# Patient Record
Sex: MALE | Employment: OTHER | URBAN - METROPOLITAN AREA
[De-identification: names, ages, dates, MRNs, and addresses within clinical notes are randomized per-mention and may not be internally consistent; named-entity substitution may affect disease eponyms.]

---

## 2018-01-10 ENCOUNTER — MEDICAL CORRESPONDENCE (OUTPATIENT)
Dept: HEALTH INFORMATION MANAGEMENT | Facility: CLINIC | Age: 83
End: 2018-01-10

## 2018-01-11 ENCOUNTER — HOSPITAL ENCOUNTER (INPATIENT)
Facility: CLINIC | Age: 83
LOS: 4 days | Discharge: NURSING, REHAB OR LONG-TERM CARE | DRG: 698 | End: 2018-01-15
Attending: INTERNAL MEDICINE | Admitting: INTERNAL MEDICINE
Payer: MEDICARE

## 2018-01-11 ENCOUNTER — TRANSFERRED RECORDS (OUTPATIENT)
Dept: HEALTH INFORMATION MANAGEMENT | Facility: CLINIC | Age: 83
End: 2018-01-11

## 2018-01-11 DIAGNOSIS — N13.2 HYDRONEPHROSIS WITH URINARY OBSTRUCTION DUE TO URETERAL CALCULUS: ICD-10-CM

## 2018-01-11 DIAGNOSIS — N39.0 URINARY TRACT INFECTION WITH HEMATURIA, SITE UNSPECIFIED: ICD-10-CM

## 2018-01-11 DIAGNOSIS — K59.09 OTHER CONSTIPATION: Primary | ICD-10-CM

## 2018-01-11 DIAGNOSIS — R31.9 URINARY TRACT INFECTION WITH HEMATURIA, SITE UNSPECIFIED: ICD-10-CM

## 2018-01-11 PROBLEM — F03.90 DEMENTIA (H): Status: ACTIVE | Noted: 2018-01-11

## 2018-01-11 PROBLEM — R45.1 AGITATION: Status: ACTIVE | Noted: 2018-01-11

## 2018-01-11 PROBLEM — G93.40 ENCEPHALOPATHY: Status: ACTIVE | Noted: 2018-01-11

## 2018-01-11 PROCEDURE — 99207 ZZC APP CREDIT; MD BILLING SHARED VISIT: CPT | Performed by: INTERNAL MEDICINE

## 2018-01-11 PROCEDURE — 99219 ZZC INITIAL OBSERVATION CARE,LEVL II: CPT | Performed by: INTERNAL MEDICINE

## 2018-01-11 PROCEDURE — 12000000 ZZH R&B MED SURG/OB

## 2018-01-11 PROCEDURE — G0378 HOSPITAL OBSERVATION PER HR: HCPCS

## 2018-01-11 PROCEDURE — 25000132 ZZH RX MED GY IP 250 OP 250 PS 637: Performed by: INTERNAL MEDICINE

## 2018-01-11 PROCEDURE — 99207 ZZC CDG-CODE CATEGORY CHANGED: CPT | Performed by: INTERNAL MEDICINE

## 2018-01-11 PROCEDURE — 25000128 H RX IP 250 OP 636: Performed by: INTERNAL MEDICINE

## 2018-01-11 RX ORDER — TAMSULOSIN HYDROCHLORIDE 0.4 MG/1
0.4 CAPSULE ORAL DAILY
Status: DISCONTINUED | OUTPATIENT
Start: 2018-01-11 | End: 2018-01-15 | Stop reason: HOSPADM

## 2018-01-11 RX ORDER — AMOXICILLIN 250 MG
2 CAPSULE ORAL 2 TIMES DAILY PRN
Status: DISCONTINUED | OUTPATIENT
Start: 2018-01-11 | End: 2018-01-15 | Stop reason: HOSPADM

## 2018-01-11 RX ORDER — SODIUM CHLORIDE 9 MG/ML
INJECTION, SOLUTION INTRAVENOUS CONTINUOUS
Status: DISCONTINUED | OUTPATIENT
Start: 2018-01-11 | End: 2018-01-14

## 2018-01-11 RX ORDER — NALOXONE HYDROCHLORIDE 0.4 MG/ML
.1-.4 INJECTION, SOLUTION INTRAMUSCULAR; INTRAVENOUS; SUBCUTANEOUS
Status: DISCONTINUED | OUTPATIENT
Start: 2018-01-11 | End: 2018-01-15 | Stop reason: HOSPADM

## 2018-01-11 RX ORDER — BISACODYL 10 MG
10 SUPPOSITORY, RECTAL RECTAL DAILY PRN
Status: DISCONTINUED | OUTPATIENT
Start: 2018-01-11 | End: 2018-01-15 | Stop reason: HOSPADM

## 2018-01-11 RX ORDER — CITALOPRAM HYDROBROMIDE 20 MG/1
20 TABLET ORAL DAILY
Status: DISCONTINUED | OUTPATIENT
Start: 2018-01-11 | End: 2018-01-15 | Stop reason: HOSPADM

## 2018-01-11 RX ORDER — AMOXICILLIN 250 MG
1 CAPSULE ORAL 2 TIMES DAILY PRN
Status: DISCONTINUED | OUTPATIENT
Start: 2018-01-11 | End: 2018-01-15 | Stop reason: HOSPADM

## 2018-01-11 RX ADMIN — CITALOPRAM HYDROBROMIDE 20 MG: 20 TABLET ORAL at 08:12

## 2018-01-11 RX ADMIN — SODIUM CHLORIDE: 9 INJECTION, SOLUTION INTRAVENOUS at 05:57

## 2018-01-11 RX ADMIN — SODIUM CHLORIDE: 9 INJECTION, SOLUTION INTRAVENOUS at 15:28

## 2018-01-11 RX ADMIN — TAMSULOSIN HYDROCHLORIDE 0.4 MG: 0.4 CAPSULE ORAL at 08:13

## 2018-01-11 NOTE — IP AVS SNAPSHOT
` [place] [address] [facility]: [phone]                                              INTERAGENCY TRANSFER FORM - NURSING   2018                    Hospital Admission Date: 2018  ARELIS ALLAN   : 1935  Sex: Male        Attending Provider: Prasanth Zavala MD     Allergies:  Oxycodone    Infection:  None   Service:  INTERNAL MED    Ht:  --   Wt:  66.4 kg (146 lb 4.8 oz)   Admission Wt:  66.4 kg (146 lb 4.8 oz)    BMI:  --   BSA:  --            Patient PCP Information     Provider PCP Type    Physician No Ref-Primary General      Current Code Status     Date Active Code Status Order ID Comments User Context       Prior      Code Status History     Date Active Date Inactive Code Status Order ID Comments User Context    1/15/2018  9:31 AM  Special Code [id number] CPR OK. But do not intubate  (per POLST) Valdez Mclaughlin,  Outpatient    2018  5:58 AM 1/15/2018  9:31 AM Special Code [id number] CPR OK. But do not intubate  (per POLST) Yobany Santillan MD Inpatient    2018  5:21 AM 2018  5:58 AM Full Code [id number]  Yobany Santillan MD Inpatient      Advance Directives        Does patient have a scanned Advance Directive/ACP document in EPIC?           Yes        Hospital Problems as of 1/15/2018              Priority Class Noted POA    Encephalopathy Medium  2018 Yes    Dementia Medium  2018 Unknown    Urinary tract infection Medium  2018 Unknown    Urethral stenosis Medium  2018 Unknown    Agitation Medium  2018 Yes    UTI (urinary tract infection) Medium  2018 Yes      Non-Hospital Problems as of 1/15/2018              Priority Class Noted    iamSPRAIN ROTATOR CUFF Medium  7/3/2006      Immunizations     Name Date      Influenza (High Dose) 3 valent vaccine 10/16/17     Pneumo Conj 13-V (2010&after) 12/04/15          END      ASSESSMENT     Discharge Profile Flowsheet     EXPECTED DISCHARGE     SKIN       "Expected Discharge Date  01/13/18 01/11/18 1200   Inspection of bony prominences  Full 01/14/18 2211    DISCHARGE NEEDS ASSESSMENT     Inspection under devices  Full 01/14/18 2211    Equipment Currently Used at Home  walker, rolling 01/13/18 1236   Skin WDL  ex 01/15/18 1046    Transportation Available  family or friend will provide 01/13/18 1236   Skin Temperature  warm 01/15/18 1046    GASTROINTESTINAL (ADULT,PEDIATRIC,OB)     Skin Moisture  flaky;dry 01/15/18 1046    GI WDL  WDL 01/15/18 1046   Skin Integrity  bruise(s);scab(s) 01/15/18 1046    Passing flatus  yes 01/14/18 2211   SAFETY      COMMUNICATION ASSESSMENT     Safety WDL  WDL 01/15/18 1046    Patient's communication style  spoken language (English or Bilingual) 01/11/18 0526   All Alarms  (P)  alarm(s) activated and audible 01/15/18 1615                 Assessment WDL (Within Defined Limits) Definitions           Safety WDL     Effective: 09/28/15    Row Information: <b>WDL Definition:</b> Bed in low position, wheels locked; call light in reach; upper side rails up x 2; ID band on<br> <font color=\"gray\"><i>Item=AS safety wdl>>List=AS safety wdl>>Version=F14</i></font>      Skin WDL     Effective: 09/28/15    Row Information: <b>WDL Definition:</b> Warm; dry; intact; elastic; without discoloration; pressure points without redness<br> <font color=\"gray\"><i>Item=AS skin wdl>>List=AS skin wdl>>Version=F14</i></font>      Vitals     Vital Signs Flowsheet     VITAL SIGNS     Pain Location  Leg 01/12/18 1519    Temp  97.8  F (36.6  C) 01/15/18 1600   Pain Orientation  Right;Lower 01/12/18 1519    Temp src  Oral 01/15/18 1600   Pain Intervention(s)  MD notified (Comment) 01/12/18 1519    Resp  18 01/15/18 0749   HEIGHT AND WEIGHT      Pulse  70 01/15/18 1600   Weight  66.4 kg (146 lb 4.8 oz) 01/11/18 0604    Pulse/Heart Rate Source  Monitor 01/15/18 1600   Weight Method  Bed scale 01/11/18 0604    BP  129/76 01/15/18 1600   POSITIONING      BP Location  Right " arm 01/15/18 1600   Body Position  side-lying, right;independently positioning 01/15/18 0712    OXYGEN THERAPY     Head of Bed (HOB)  HOB at 20-30 degrees 01/15/18 0712    SpO2  95 % 01/15/18 1600   Positioning/Transfer Devices  pillows;in use 01/15/18 0712    O2 Device  None (Room air) 01/15/18 1600   Chair  Upright in chair 01/15/18 0752    PAIN/COMFORT     DAILY CARE      Patient Currently in Pain  denies 01/15/18 1413   Activity Management  activity encouraged;up in chair 01/15/18 1414    Preferred Pain Scale  number (Numeric Rating Pain Scale) 01/14/18 0930   Activity Assistance Provided  assistance, 1 person 01/15/18 1414    Patient's Stated Pain Goal  No pain 01/14/18 0930   Assistive Device Utilized  walker;gait belt 01/15/18 1414    0-10 Pain Scale  0 01/15/18 0826                 Patient Lines/Drains/Airways Status    Active LINES/DRAINS/AIRWAYS     Name: Placement date: Placement time: Site: Days: Last dressing change:    Urethral Catheter 01/11/18   1500      4     Peripheral IV 01/12/18 Left Lower forearm 01/12/18      Lower forearm   3     Wound 01/13/18 Left;Lateral;Lower Leg Abrasion(s) 01/13/18      Leg   2             Patient Lines/Drains/Airways Status    Active PICC/CVC     None            Intake/Output Detail Report     Date Intake   Output Net    Shift P.O. I.V. Total Urine Total       Day 01/14/18 0700 - 01/14/18 1459 --  1000 -400    Violeta 01/14/18 1500 - 01/14/18 2259 -- -- -- 1100 1100 -1100    Noc 01/14/18 2300 - 01/15/18 0659 -- -- -- -- -- 0    Day 01/15/18 0700 - 01/15/18 1459 480 -- 480 1725 1725 -1245    Violeta 01/15/18 1500 - 01/15/18 2259 -- -- -- -- -- 0      Last Void/BM       Most Recent Value    Urine Occurrence 1 at 01/14/2018 1943    Stool Occurrence 1 at 01/14/2018 1943      Case Management/Discharge Planning     Case Management/Discharge Planning Flowsheet     LIVING ENVIRONMENT     Final Note  -- (D/C to [facility]) 01/15/18 1203    Lives With  alone 01/13/18 1236   FINAL  RESOURCES      Living Arrangements  assisted living 01/13/18 1236   Equipment Currently Used at Home  walker, rolling 01/13/18 1236    COPING/STRESS     ABUSE RISK SCREEN      Major Change/Loss/Stressor  hospitalization 01/11/18 0526   QUESTION TO PATIENT:  Has a member of your family or a partner(now or in the past) intimidated, hurt, manipulated, or controlled you in any way?  no 01/11/18 0630    EXPECTED DISCHARGE     QUESTION TO PATIENT: Do you feel safe going back to the place where you are living?  yes 01/11/18 0630    Expected Discharge Date  01/13/18 01/11/18 1200   OBSERVATION: Is there reason to believe there has been maltreatment of a vulnerable adult (ie. Physical/Sexual/Emotional abuse, self neglect, lack of adequate food, shelter, medical care, or financial exploitation)?  no 01/11/18 0630    DISCHARGE PLANNING     (R) MENTAL HEALTH SUICIDE RISK      Transportation Available  family or friend will provide 01/13/18 1236   Are you depressed or being treated for depression?  No 01/11/18 0630    FINAL NOTE

## 2018-01-11 NOTE — IP AVS SNAPSHOT
` `           [place] [address] [facility] UNIT: [phone]                 INTERAGENCY TRANSFER FORM - NOTES (H&P, Discharge Summary, Consults, Procedures, Therapies)   2018                    Hospital Admission Date: 2018  ARELIS PERKINS   : 1935  Sex: Male        Patient PCP Information     Provider PCP Type    Physician No Ref-Primary General         History & Physicals      H&P signed by Yobany Santillan MD at 2018  6:55 AM      Author:  Yobany Santillan MD Service:  Hospitalist Author Type:  Physician    Filed:  2018  6:55 AM Date of Service:  2018  6:13 AM Creation Time:  2018  6:46 AM    Status:  Signed :  Yobany Santillan MD (Physician)         DATE OF SERVICE:  2018      CHIEF COMPLAINT:  Confusion and dizziness.      HISTORY OF PRESENT ILLNESS:  Mr. Arleis Perkins is a pleasant 82-year-old gentleman with dementia who lives in an assisted living facility who initially presented to the [facility] Emergency Department with his son because of dizziness and confusion that occurred today at his assisted living.  According to the son who is his primary historian the patient had developed some nausea and vomiting during the evening which resulted in a fall.  At that point he decided to take the patient to the Emergency Department.  He was seen in the [facility] Emergency Department and while there underwent routine labs as well as a CT scan of the head due to the fall and confusion.  The head CT was read as negative for any acute intracranial abnormalities, but he does have some age-related brain atrophy and evidence of chronic small vessel ischemic disease.  The patient was noted to have a large amount of leukocyte esterase, trace amount of occult blood that was suspicious for a urinary tract infection.  Additionally, there is some associated leukocytosis in the patient's peripheral blood and based on these findings, the ER  physician there started the patient on ceftriaxone.  Also noteworthy he had a mild elevation of his lactate at 2.1 and had elevated BUN to creatinine ratio of 22-1.06 and as such, he was given IV fluids as well.  There were no beds in the [facility] and as such, the patient was transferred to [facility] here where I met with the patient and his son on the floor.  The patient himself is fairly confused at this time and is more confused than his baseline according to the son.  He is not really able to provide any history for me.  He is pleasant and conversant, but does not recall any of the events of the evening.  Per the son, he has dementia but is normally not this out of sorts.  The son provides the rest of history for me.  There have not been any definite fevers, he has not been short of breath or any chest pain, no diarrhea and he has not had any recent blood in the stools.  They did place a Smith catheter at the [facility] due to the patient's urinary retention and that remains in place at this time with now clear urine output.      The son denies any history of any heart disease, heart failure or stroke, seizure, clotting or bleeding disorders or thyroid disease or diabetes.      PAST MEDICAL HISTORY:   1.  Urinary retention.   2.  History of falls.   3.  History of displaced subtrochanteric fracture of the right femur in 09/2017.   4.  Dementia.      SOCIAL HISTORY:  The patient is a lifelong nonsmoker and does not drink alcohol.      FAMILY HISTORY:  Negative for heart disease or diabetes.      PRIOR TO ADMISSION MEDICATIONS:   1.  Tylenol 500 mg p.o. 2 tabs 3 times daily p.r.n.   2.  Vitamin D3 at 2000 units p.o. daily.   3.  Celexa 20 mg p.o. daily.   4.  Senokot-S 1 tablet p.o. twice daily.   5.  Flomax 0.4 mg p.o. daily.      ALLERGIES:  Hallucinations with Oxycodone.      PAST SURGICAL HISTORY:     1.  Status post ORIF of right hip in 09/2017.   2.  Status post colonoscopies in the  past.      REVIEW OF SYSTEMS:  A 10-point system review was impossible for the patient, but I did inquire via his son as the surrogate historian and it was negative with the exception of those complaints noted in the HPI.      PHYSICAL EXAMINATION:   VITAL SIGNS:  Temperature 98.4, heart rate 65, blood pressure 135/78, respiratory rate 16, O2 saturation 94% on room air.   GENERAL:  This is a very pleasant, but clearly demented gentleman who is conversant but pleasantly confused.  He is lying in a hospital bed.  His son is at his bedside.   HEENT:  No facial asymmetry.  Eyes:  Pupils are equally reactive to light bilaterally, extraocular movements are intact.  Oropharynx is clear.  Tongue is midline.  Mucous membranes are a little bit dry appearing.   RESPIRATORY:  Clear lung fields bilaterally without crackles or wheezes.   CARDIAC:  Regular rate and rhythm, S1, S2 are heard.  There is a 3/6 systolic ejection murmur best heard at the upper left sternal border.   ABDOMEN:  Soft, nontender, nondistended.  Bowel sounds are heard.   EXTREMITIES:  Lower extremity exam is negative for significant pedal edema.  Pedal pulses are about 1.5 plus bilaterally.   SKIN:  The patient has good capillary refill at this time.   NEUROLOGIC:  Cranial nerves II through XII are grossly intact on cursory exam.      LABORATORY FINDINGS INVESTIGATIONS:  A CT scan of the head was performed at the 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  A white blood cell count was 13.7, hemoglobin 14.6 and a platelet count was 259.  The absolute neutrophil count was 12.1.  Lactic acid was 2.1, sodium was 139, potassium 4.4, chloride 101, CO2 of 24, BUN is 22 with creatinine 1.06  and a glucose was elevated at 146, calcium 9.8.  Chest x-ray was also performed at the outside facility and read as bibasilar moderate discoid atelectasis with chronic-appearing mild compression deformity present in the lower thoracic spine.      ASSESSMENT AND PLAN:  This is an 82-year-old gentleman presenting with an acute metabolic encephalopathy secondary to urinary tract infection.      1.  Urinary tract infection.  He was initiated on ceftriaxone at the outside facility.  We will continue IV ceftriaxone 1 gram q.24 h. We will also hydrate him with IV fluids.   2.  Lactic acidosis is likely secondary to dehydration and urinary tract infection.  We will hydrate him with IV fluids.   3.  Metabolic encephalopathy.  This is likely secondary to the urinary tract infection.  We will treat the underlying infection and monitor his clinical status.     4.  Dementia.  Normally his dementia is stable without behavioral disturbances but at present he is having some encephalopathy and dizziness.  We will hydrate him with IV fluids and redirect him.   5.  History of urinary retention.  He has a Smith catheter in place now.  He normally takes Flomax as an outpatient.  We will resume his prior to admission Flomax.   6.  Depression.  He normally uses Celexa.  We will continue this.   7.  Deep venous thrombosis prophylaxis will be pneumatic compression devices.      CODE STATUS:  I discussed with his son and reviewed a copy of the POLST form that the patient's daughter sent to the son via his telephone and according to the POLST the patient is okay with CPR but not with intubation and I have put in an order to reflect this in the chart.      DISPOSITION:  Inpatient.  I anticipate he will be here a few days.         CONSUELO LIPSCOMB MD             D: 2018 06:13   T: 2018 06:45   MT:       Name:     ARELIS ALLAN   MRN:      [id number]        Account:      [id number]   :      1935            Admitted:     [id number]      Document: [id number]    [PG1.1]   Revision History        User Key Date/Time User Provider Type Action    > PG1.1 1/11/2018  6:55 AM Yobany Santillan MD Physician Sign                     Discharge Summaries      Discharge Summaries by Valdez Mclaughlin DO at 1/15/2018  9:31 AM     Author:  Valdez Mclaughlin DO Service:  Hospitalist Author Type:  Physician    Filed:  1/15/2018  9:36 AM Date of Service:  1/15/2018  9:31 AM Creation Time:  1/15/2018  9:31 AM    Status:  Signed :  Valdez Mclaughlin DO (Physician)         [facility]    Discharge Summary  Hospitalist    Date of Admission:  1/11/2018  Date of Discharge:[ES1.1]  1/15/2018[ES1.2]  Discharging Provider: Valdez Mclaughlin DO    Discharge Diagnoses   UTI with   Metabolic encephalopathy  Urinary obstruction, acute    History of Present Illness   Kaz Perkins is an 82 year old male who presented with acute urinary retention    Hospital Course   Kaz Perkins was admitted on 1/11/2018.  The following problems were addressed during his hospitalization:    Active Problems:    Encephalopathy    Dementia    Urinary tract infection    Urethral stenosis    Agitation    UTI (urinary tract infection)  1. UTI, but culture only with < 10,000 CFU of mixed burak. Given unclear presentation, timing of antibiotics at outside hospital will complete 5 day course, although I suspect most of his symptoms are related to obstruction rather than infection  - Continue Rocephin, started 1/11, had 4 days, will complete 1 day of augmentin       2. Urinary retention  - Smith placed at outside ED  - Continue Flomax  - fu with his urologist Dr. chance     3.  Metabolic encephalopathy  - Resolved with treatment of UTI  - Underlying dementia  - Head CT without acute changes  - Redirection as needed  - Fall precautions     4. Depression  - Continue Celexa     5. R leg/hip pain  - X-ray demonstrates  healing ORIF R femur w/o obvious new fracture  - Ortho Consult appreciated, no evidence for fracture  - Ambulate with assist qid  - PT at [facility]    Valdez Mclaughlin, DO    Significant Results and Procedures   NA    Pending Results   These results will be followed up by NA  Unresulted Labs Ordered in the Past 30 Days of this Admission     No orders found from 11/12/2017 to 1/12/2018.          Code Status   CPR but no intubation per polst       Primary Care Physician   Physician No Ref-Primary    Physical Exam   Temp: 97.7  F (36.5  C) Temp src: Oral BP: 131/77 Pulse: 67   Resp: 18 SpO2: 93 % O2 Device: None (Room air)    Vitals:    01/11/18 0527   Weight: 66.4 kg (146 lb 4.8 oz)     Vital Signs with Ranges  Temp:  [97.7  F (36.5  C)-98.1  F (36.7  C)] 97.7  F (36.5  C)  Pulse:  [62-67] 67  Resp:  [16-18] 18  BP: (131-145)/(77-84) 131/77  SpO2:  [92 %-94 %] 93 %  I/O last 3 completed shifts:  In: 600 [I.V.:600]  Out: 2100 [Urine:2100]    Constitutional: Awake, alert, cooperative, no apparent distress.  Eyes: Conjunctiva and pupils examined and normal.  Respiratory: Clear to auscultation bilaterally, no crackles or wheezing.  Cardiovascular: Regular rate and rhythm, normal S1 and S2, and no murmur noted.  Skin: No rashes, no cyanosis, no edema.  Musculoskeletal: No joint swelling, erythema or tenderness.  Neurologic: Cranial nerves 2-12 intact, normal strength and sensation.  Psychiatric: Alert, oriented to person only no obvious anxiety or depression.    Discharge Disposition   Discharged to short-term care facility  Condition at discharge: Stable    Consultations This Hospital Stay   PHYSICAL THERAPY ADULT IP CONSULT  ORTHOPEDIC SURGERY IP CONSULT  PHYSICAL THERAPY ADULT IP CONSULT  OCCUPATIONAL THERAPY ADULT IP CONSULT    Time Spent on this Encounter   IValdez, personally saw the patient today and spent greater than 30 minutes discharging this patient.    Discharge Orders     General info for SNF    Length of Stay Estimate: Short Term Care: Estimated # of Days <30  Condition at Discharge: Improving  Level of care:skilled   Rehabilitation Potential: Fair  Admission H&P remains valid and up-to-date: Yes  Recent Chemotherapy: N/A  Use Nursing Home Standing Orders: N/A     Mantoux instructions   Give two-step Mantoux (PPD) Per Facility Policy Yes     Reason for your hospital stay   Urinary obstruction     Alicia catheter   To straight gravity drainage. Change catheter every 2 weeks and PRN for leaking or decreased uring output with signs of bladder distention. DO NOT change catheter without a specific MD order IF diagnosis of benign prostatic hypertrophy (BPH), neurogenic bladder, or other urological conditions     Follow Up and recommended labs and tests   Follow up with Nursing home physician.  No follow up labs or test are needed.    Follow up with urologist, Dr. Michel in 1-2 weeks for urinary obstruction and to work on alicia removal     Activity - Up with nursing assistance     Special Code (specify)   CPR OK. But do not intubate  (per POLST)     Physical Therapy Adult Consult   Evaluate and treat as clinically indicated.    Reason:  Deconditioning, recent hip fracture     Occupational Therapy Adult Consult   Evaluate and treat as clinically indicated.    Reason:  Deconditioning, recent hip fracture       Discharge Medications   Current Discharge Medication List      START taking these medications    Details   senna-docusate (SENOKOT-S;PERICOLACE) 8.6-50 MG per tablet Take 1 tablet by mouth 2 times daily as needed for constipation  Qty: 100 tablet, Refills: 0    Associated Diagnoses: Other constipation         CONTINUE these medications which have NOT CHANGED    Details   Acetaminophen (TYLENOL PO) Take 1,000 mg by mouth 3 times daily Do not exceed 4000 mg po in 24 hours.      CITALOPRAM HYDROBROMIDE PO Take 20 mg by mouth daily      TAMSULOSIN HCL PO Take 0.4 mg by mouth every morning After breakfast       VITAMIN D, CHOLECALCIFEROL, PO Take 1,000 Units by mouth daily           Allergies   Allergies   Allergen Reactions     Oxycodone Other (See Comments)     HALLUCINATIONS     Data   Most Recent 3 CBC's:[ES1.1]  Recent Labs   Lab Test  01/13/18   0807  01/12/18   0800   WBC  6.3  7.8   HGB  13.4  12.4*   MCV  94  94   PLT  201  221[ES1.2]      Most Recent 3 BMP's:[ES1.1]  Recent Labs   Lab Test  01/13/18   0807   NA  140   POTASSIUM  4.2   CHLORIDE  105   CO2  30   BUN  16   CR  1.06   ANIONGAP  5   EBONIE  8.5   GLC  90[ES1.2]     Most Recent 2 LFT's:[ES1.1]No lab results found.[ES1.2]  Most Recent INR's and Anticoagulation Dosing History:  Anticoagulation Dose History     There is no flowsheet data to display.        Most Recent 3 Troponin's:[ES1.1]No lab results found.[ES1.2]  Most Recent Cholesterol Panel:[ES1.1]No lab results found.[ES1.2]  Most Recent 6 Bacteria Isolates From Any Culture (See EPIC Reports for Culture Details):[ES1.1]No lab results found.[ES1.2]  Most Recent TSH, T4 and A1c Labs:[ES1.1]No lab results found.  Results for orders placed or performed during the hospital encounter of 01/11/18   XR Pelvis w Hip Right 1 View    Narrative    PELVIS WITH UNILATERAL HIP ONE VIEW RIGHT  1/12/2018 4:41 PM     HISTORY: r hip pain;     COMPARISON: None.    FINDINGS: The patient is status post internal fixation of a right  subtrochanteric femur fracture. The intramedullary jeanne is in good  position. There is good alignment of the fracture fragments. Callus is  seen at the fracture site. A small amount of heterotopic bone is seen  cephalad to the greater trochanter. Catheter is present in the  bladder. Mild degenerative change in the hip joints..      Impression    IMPRESSION:   1. Patient is status post internal fixation of a right subtrochanteric  femur fracture. Callus is seen at the fracture site.  2. No acute fractures are seen.  3. Degenerative change in the hip and knee joints.    AMBER SPAIN MD[ES1.2]         Revision History        User Key Date/Time User Provider Type Action    > ES1.2 1/15/2018  9:36 AM Valdze Mclaughlin DO Physician Sign     ES1.1 1/15/2018  9:31 AM Valdez Mclaughlin DO Physician                      Consult Notes      Consults by Edil Grant MD at 1/13/2018 12:34 PM     Author:  Edil Grant MD Service:  Orthopedics Author Type:  Physician    Filed:  1/13/2018 12:39 PM Date of Service:  1/13/2018 12:34 PM Creation Time:  1/13/2018 12:33 PM    Status:  Signed :  Edil Grant MD (Physician)     Consult Orders:    1. Orthopedic Surgery IP Consult: Patient to be seen: Routine - within 24 hours; right hip pain, ORIF in Sept 2017; Consultant may enter orders: Yes [[id number]] ordered by Prasanth Zavala MD at 01/13/18 [address] [place] [address] Orthopedic Consultation    Kaz Perkins MRN# [id number]   Age: 82 year old YOB: 1935     Date of Admission:  1/11/2018    Reason for consult: H/o R hip sx s/p cephalomedullary nail       Requesting physician: Dr. Zavala       Level of consult: One-time consult to assist in determining a diagnosis and to recommend an appropriate treatment plan           Assessment and Plan:   Assessment:   R hip pain      Plan:   - WBAT RLE  - PT/OT for mobilization  - Hip fracture appears to be healing appropriately  - no concern for other R hip pathology  - f/u with operative surgeon at [facility] after discharge           Chief Complaint:   R hip pain         History of Present Illness:   This patient is a 82 year old male who presents with the following condition requiring a hospital admission:    81 yo M with dementia admitted 2/2 confusion and dizziness. Patient has a history of R hip fracture that was treated with cephalomedullary nailing in [facility] in September. Overall patient seems confused and is unable to give a good history. When asked, he did not complain of significant hip pain, except a  bump over her R hip. He states he is able to ambulate. He also states he is not sure why he is here. Overall seems very confused.          Past Medical History:[JM1.1]   No past medical history on file.[JM1.2]          Past Surgical History:[JM1.1]   No past surgical history on file.[JM1.2]          Social History:[JM1.1]     Social History   Substance Use Topics     Smoking status: Not on file     Smokeless tobacco: Not on file     Alcohol use Not on file[JM1.2]             Family History:[JM1.1]   No family history on file.[JM1.2]          Immunizations:     VACCINE/DOSE   Diptheria   DPT   DTAP   HBIG   Hepatitis A   Hepatitis B   HIB   Influenza   Measles   Meningococcal   MMR   Mumps   Pneumococcal   Polio   Rubella   Small Pox   TDAP   Varicella   Zoster             Allergies:[JM1.1]     Allergies   Allergen Reactions     Oxycodone Other (See Comments)     HALLUCINATIONS[JM1.2]             Medications:[JM1.1]     Current Facility-Administered Medications   Medication     acetaminophen (TYLENOL) tablet 1,000 mg     naloxone (NARCAN) injection 0.1-0.4 mg     0.9% sodium chloride infusion     senna-docusate (SENOKOT-S;PERICOLACE) 8.6-50 MG per tablet 1 tablet    Or     senna-docusate (SENOKOT-S;PERICOLACE) 8.6-50 MG per tablet 2 tablet     magnesium hydroxide (MILK OF MAGNESIA) suspension 30 mL     bisacodyl (DULCOLAX) Suppository 10 mg     cefTRIAXone (ROCEPHIN) 1 g in 10 mL SWFI Premix Syringe     tamsulosin (FLOMAX) capsule 0.4 mg     citalopram (celeXA) tablet 20 mg[JM1.2]             Review of Systems:   Negative except as per hpi          Physical Exam:   All vitals have been reviewed[JM1.1]  Patient Vitals for the past 24 hrs:   BP Temp Temp src Pulse Resp SpO2   01/13/18 0011 158/77 97.6  F (36.4  C) Oral 52 16 96 %   01/12/18 1945 148/73 97.7  F (36.5  C) Oral 61 16 94 %   01/12/18 1558 146/79 97.8  F (36.6  C) Oral 58 16 94 %[JM1.2]       Intake/Output Summary (Last 24 hours) at 01/13/18 1234  Last data  filed at 01/13/18 1007   Gross per 24 hour   Intake             1900 ml   Output             1900 ml   Net                0 ml     RLE:  R hip incisions c/d/i  Flexion 90, IR 10, ER 30 without significant pain  +mild bony protuberance over the lateral hip, due to new bone formation evident on xray.  +DF/PF/EHL  sensation intact throughout RLE  Unable to test gait 2/2 patient mental status          Data:   All laboratory data reviewed[JM1.1]  Results for orders placed or performed during the hospital encounter of 01/11/18   XR Pelvis w Hip Right 1 View    Narrative    PELVIS WITH UNILATERAL HIP ONE VIEW RIGHT  1/12/2018 4:41 PM     HISTORY: r hip pain;     COMPARISON: None.    FINDINGS: The patient is status post internal fixation of a right  subtrochanteric femur fracture. The intramedullary jeanne is in good  position. There is good alignment of the fracture fragments. Callus is  seen at the fracture site. A small amount of heterotopic bone is seen  cephalad to the greater trochanter. Catheter is present in the  bladder. Mild degenerative change in the hip joints..      Impression    IMPRESSION:   1. Patient is status post internal fixation of a right subtrochanteric  femur fracture. Callus is seen at the fracture site.  2. No acute fractures are seen.  3. Degenerative change in the hip and knee joints.    AMBER SPAIN MD   CBC with platelets   Result Value Ref Range    WBC 7.8 4.0 - 11.0 10e9/L    RBC Count 3.95 (L) 4.4 - 5.9 10e12/L    Hemoglobin 12.4 (L) 13.3 - 17.7 g/dL    Hematocrit 37.2 (L) 40.0 - 53.0 %    MCV 94 78 - 100 fl    MCH 31.4 26.5 - 33.0 pg    MCHC 33.3 31.5 - 36.5 g/dL    RDW 14.3 10.0 - 15.0 %    Platelet Count 221 150 - 450 10e9/L   Basic metabolic panel   Result Value Ref Range    Sodium 140 133 - 144 mmol/L    Potassium 4.2 3.4 - 5.3 mmol/L    Chloride 105 94 - 109 mmol/L    Carbon Dioxide 30 20 - 32 mmol/L    Anion Gap 5 3 - 14 mmol/L    Glucose 90 70 - 99 mg/dL    Urea Nitrogen 16 7 - 30 mg/dL     Creatinine 1.06 0.66 - 1.25 mg/dL    GFR Estimate 67 >60 mL/min/1.7m2    GFR Estimate If Black 81 >60 mL/min/1.7m2    Calcium 8.5 8.5 - 10.1 mg/dL   CBC with platelets   Result Value Ref Range    WBC 6.3 4.0 - 11.0 10e9/L    RBC Count 4.24 (L) 4.4 - 5.9 10e12/L    Hemoglobin 13.4 13.3 - 17.7 g/dL    Hematocrit 39.8 (L) 40.0 - 53.0 %    MCV 94 78 - 100 fl    MCH 31.6 26.5 - 33.0 pg    MCHC 33.7 31.5 - 36.5 g/dL    RDW 14.0 10.0 - 15.0 %    Platelet Count 201 150 - 450 10e9/L            Edil Grant MD[JM1.2]          Revision History        User Key Date/Time User Provider Type Action    > JM1.2 1/13/2018 12:39 PM Edil Grant MD Physician Sign     JM1.1 1/13/2018 12:33 PM Edil Grant MD Physician                      Progress Notes - Physician (Notes from 01/12/18 through 01/15/18)      Progress Notes by Basilio Reis at 1/15/2018 10:57 AM     Author:  Basilio Reis Service:  Social Work Author Type:      Filed:  1/15/2018 12:07 PM Date of Service:  1/15/2018 10:57 AM Creation Time:  1/15/2018 10:57 AM    Status:  Addendum :  Basilio Reis ()         HECTOR  D) Patient is ready for D/C to TCU today.  I) Followed up with [facility]'s and was told they have no appropriate beds available today.  Spoke with daughter, Vicki about the other Memory Care TCU; [facility]. Vicki agrees to a referral there, which was sent via the DOD process. Spoke with Cesar in Admissions. Vicki also requests a referral to [facility], which was sent. Rachel in Admissions has already called back to say she does not have an appropriate bed for patient today.  P) Awaiting response from [facility].[RH1.1]    Addendum  PAS-RR    Per DHS regulation, HECTOR completed and submitted PAS-RR to [place] [employer] on [employer]ging Direct Connect via the Senior LinkAge Line.  PAS-RR confirmation # is : [id number]  Further questions may be directed to [facility] LinkAge Line at #[phone], option #4 for PAS-RR staff.[RH1.2]  Patient  has been accepted at [facility] in a Memory Care TCU bed. There is no charge for the private room there, per Cesar in Admissions.   Orders and the PAS were faxed.  Spoke with daughter Vicki who agrees to this plan. She requests wheelchair transport and was advised patient will be billed for this. Vicki will be in this afternoon to update patient on the plan herself.[RH1.3]      Revision History        User Key Date/Time User Provider Type Action    > RH1.3 1/15/2018 12:07 PM Basilio Reis  Addend     RH1.2 1/15/2018 11:15 AM Basilio Reis  Addend     RH1.1 1/15/2018 11:02 AM Basilio Reis  Sign            Progress Notes by Prasanth Zavala MD at 1/14/2018  1:16 PM     Author:  Prasanth Zavala MD Service:  Hospitalist Author Type:  Physician    Filed:  1/14/2018  1:22 PM Date of Service:  1/14/2018  1:16 PM Creation Time:  1/14/2018  1:16 PM    Status:  Signed :  Prasanth Zavala MD (Physician)         [facility]    Hospitalist Progress Note    Date of Service (when I saw the patient): 01/14/2018    Assessment & Plan   Kaz Perkins is a 82 year old male who was admitted on 1/11/2018 with a UTI.    1. UTI  - Continue Rocephin, started 1/11  - Eating well, saline lock    2. Urinary retention  - Smith placed at outside ED  - Continue Flomax  - Voiding trial at TCU    3.  Metabolic encephalopathy  - Resolved with treatment of UTI  - Underlying dementia  - Head CT without acute changes  - Redirection as needed  - Fall precautions    4. Depression  - Continue Celexa    5. R leg/hip pain  - X-ray demonstrates healing ORIF R femur w/o obvious new fracture  - Ortho Consult appreciated, no evidence for fracture  - Ambulate with assist qid  - PT at TCU    DVT Prophylaxis: Pneumatic Compression Devices  Code Status: Special Code    Disposition: Expected discharge in 1-2 days.    Prasanth Zavala  Text Page (7 am to 6 pm)    Interval History   The  patient is resting in bed.  He is pleasantly confused.  He denies pain.    -Data reviewed today: I reviewed all new labs and imaging results over the last 24 hours. I personally reviewed no images or EKG's today.    Physical Exam   Temp: 97.4  F (36.3  C) Temp src: Oral BP: 158/85 Pulse: 58   Resp: 16 SpO2: 92 % O2 Device: None (Room air)    Vitals:    01/11/18 0527   Weight: 66.4 kg (146 lb 4.8 oz)     Vital Signs with Ranges  Temp:  [97.4  F (36.3  C)-98.8  F (37.1  C)] 97.4  F (36.3  C)  Pulse:  [57-60] 58  Resp:  [16] 16  BP: (128-158)/(78-85) 158/85  SpO2:  [92 %-94 %] 92 %  I/O last 3 completed shifts:  In: 120 [P.O.:120]  Out: 1725 [Urine:1725]    Gen: Well nourished, elderly male, alert and oriented x 1, no acute distressed  HEENT: Atraumatic, normocephalic  Lungs: Clear to ausculation without wheezes, rhonchi, or rales  Heart: Regular rate and rhythm, no gallops or rubs, 2/6 PHILLIP  GI: Bowel sound normal, no hepatosplenomegaly or masses  Lymph: No lymphadenopathy or edema  Skin: No rashes     Medications     NaCl 75 mL/hr at 01/14/18 1051       acetaminophen (TYLENOL) tablet 1,000 mg  1,000 mg Oral TID     cefTRIAXone  1 g Intravenous Q24H     tamsulosin  0.4 mg Oral Daily     citalopram  20 mg Oral Daily       Data     Recent Labs  Lab 01/13/18  0807 01/12/18  0800   WBC 6.3 7.8   HGB 13.4 12.4*   MCV 94 94    221     --    POTASSIUM 4.2  --    CHLORIDE 105  --    CO2 30  --    BUN 16  --    CR 1.06  --    ANIONGAP 5  --    EBONIE 8.5  --    GLC 90  --        No results found for this or any previous visit (from the past 24 hour(s)).[MC1.1]       Revision History        User Key Date/Time User Provider Type Action    > MC1.1 1/14/2018  1:22 PM Prasanth Zavala MD Physician Sign            Progress Notes by Rukhsana Castro LSW at 1/14/2018  8:40 AM     Author:  Rukhsana Castro LSW Service:  Social Work Author Type:      Filed:  1/14/2018  8:41 AM Date  of Service:  1/14/2018  8:40 AM Creation Time:  1/14/2018  8:40 AM    Status:  Signed :  Rukhsana Castro LSW ()         D: SW following for DC planning. SW reviewed chart. PT recommends TCU.  I: Referral sent via [facility] to [facility], per discussion with dtr on 1/11 with SAMUEL [facility].   P: SW will follow.     MAURO Cruz, [facility]  [id number][JJ1.1]       Revision History        User Key Date/Time User Provider Type Action    > JJ1.1 1/14/2018  8:41 AM Rukhsana Castro LSW  Sign            Progress Notes by Whitney Melgar PT at 1/13/2018  3:31 PM     Author:  Whitney Melgar, PT Service:  (none) Author Type:  Physical Therapist    Filed:  1/13/2018  3:31 PM Date of Service:  1/13/2018  3:31 PM Creation Time:  1/13/2018  3:31 PM    Status:  Signed :  Whitney Melgar, PT (Physical Therapist)          01/13/18 1058   Quick Adds   Type of Visit Initial PT Evaluation   Living Environment   Lives With alone   Living Arrangements assisted living   Home Accessibility no concerns   Transportation Available family or friend will provide   Self-Care   Dominant Hand right   Usual Activity Tolerance good   Current Activity Tolerance fair   Regular Exercise no   Equipment Currently Used at Home walker, rolling   Functional Level Prior   Ambulation 1-->assistive equipment   Transferring 0-->independent   Toileting 0-->independent   Bathing 2-->assistive person   Dressing 0-->independent   Eating 0-->independent   Communication 0-->understands/communicates without difficulty   Swallowing 0-->swallows foods/liquids without difficulty   Cognition 1 - attention or memory deficits   Fall history within last six months yes   Number of times patient has fallen within last six months 2   Which of the above functional risks had a recent onset or change? ambulation;transferring;toileting;bathing;dressing   Prior Functional Level Comment Per daughter report, pt  would sometimes use FWW within apartment and other times would ambulate without AD. care home provides meals, cleaning services, and sets up showers but pt is able to dress/undress and use toilet IND. Family provides transportation and does laundry. Pt always uses FWW in halls/for distance.   General Information   Onset of Illness/Injury or Date of Surgery - Date 01/11/18   Referring Physician Prasanth Zavala MD   Patient/Family Goals Statement Return to care home   Pertinent History of Current Problem (include personal factors and/or comorbidities that impact the POC) Pt is an 81 yo male admitted due to acute metabolic encephalopathy secondary to urinary tract infection. PMH significant for dementia and past falls.   Precautions/Limitations fall precautions   Weight-Bearing Status - LUE full weight-bearing   Weight-Bearing Status - RUE full weight-bearing   Weight-Bearing Status - LLE full weight-bearing   Weight-Bearing Status - RLE weight-bearing as tolerated  (per ortho consult)   General Observations Pt supine in bed upon PT arrival. Pt confused and unreliable historian regarding living environment and prior activities he had done today.   General Info Comments Activity: Up with Assist   Cognitive Status Examination   Orientation person   Level of Consciousness confused   Follows Commands and Answers Questions 75% of the time;able to follow single-step instructions   Personal Safety and Judgment at risk behaviors demonstrated   Memory impaired  (Could not recall the MD visit about 15 minutes prior to PT)   Pain Assessment   Patient Currently in Pain No   Integumentary/Edema   Integumentary/Edema Comments Upon arrival, PT noted small skin tear on LLE just below lateral knee   Posture    Posture Forward head position;Protracted shoulders;Kyphosis   Range of Motion (ROM)   ROM Comment BLE grossly WFL with no pain reported   Strength   Strength Comments Able to move BLE against gravity, and able to complete bridge in  supine   Bed Mobility   Bed Mobility Comments Pt completes supine <>sit with CGA. Pt reported dizziness upon sitting. BP stable and pt able to continue with appt.   Transfer Skills   Transfer Comments Pt completes sit to stand from bed with minAx1 to FWW, stand to sit to bed CGA with FWW.   Gait   Gait Comments Pt ambulated 10' within room with FWW and minAx2. Pt demonstrates slightly antalgic gait and requires assistance with FWW management within room setting.    Balance   Balance Comments Pt demonstrates forward sway with righting reactions while standing within FWW but not holding on. Could maintain with eyes open for 30 seconds with no UE support.     Sensory Examination   Sensory Perception Comments Pt denied any changes in sensation in BLE.   Coordination   Coordination Comments Pt demonstrated ataxic movements of LLE during shin slide, normal RLE. Pt could complete each finger to thumb Bilaterally WNL   General Therapy Interventions   Planned Therapy Interventions balance training;bed mobility training;gait training;neuromuscular re-education;ROM;strengthening;stretching;transfer training;home program guidelines;progressive activity/exercise   Clinical Impression   Criteria for Skilled Therapeutic Intervention yes, treatment indicated   PT Diagnosis Impaired gait   Influenced by the following impairments BLE weakness, decreased coordination, confusion   Functional limitations due to impairments Decreased activity tolerance, need for increased assistance during ambulation   Clinical Presentation Evolving/Changing   Clinical Presentation Rationale Shakiness, increased confusion   Clinical Decision Making (Complexity) Low complexity   Therapy Frequency` daily   Predicted Duration of Therapy Intervention (days/wks) 4 days   Anticipated Discharge Disposition Transitional Care Facility;Long Term Care Facility   Risk & Benefits of therapy have been explained Yes   Patient, Family & other staff in agreement with  "plan of care Yes   [facility] TM \"6 Clicks\"   2016, Trustees of [facility], under license to [employer].  All rights reserved.   6 Clicks Short Forms Basic Mobility Inpatient Short Form   [facility]  \"6 Clicks\" V.2 Basic Mobility Inpatient Short Form   1. Turning from your back to your side while in a flat bed without using bedrails? 3 - A Little   2. Moving from lying on your back to sitting on the side of a flat bed without using bedrails? 3 - A Little   3. Moving to and from a bed to a chair (including a wheelchair)? 3 - A Little   4. Standing up from a chair using your arms (e.g., wheelchair, or bedside chair)? 3 - A Little   5. To walk in hospital room? 3 - A Little   6. Climbing 3-5 steps with a railing? 2 - A Lot   Basic Mobility Raw Score (Score out of 24.Lower scores equate to lower levels of function) 17   Total Evaluation Time   Total Evaluation Time (Minutes) 13[KJ1.1]        Revision History        User Key Date/Time User Provider Type Action    > KJ1.1 1/13/2018  3:31 PM Whitney Melgar, PT Physical Therapist Sign            Progress Notes by Prasanth Zavala MD at 1/13/2018 10:23 AM     Author:  Prasanth Zavala MD Service:  Hospitalist Author Type:  Physician    Filed:  1/13/2018 10:30 AM Date of Service:  1/13/2018 10:23 AM Creation Time:  1/13/2018 10:23 AM    Status:  Addendum :  Prasanth Zavala MD (Physician)         [facility]    Hospitalist Progress Note    Date of Service (when I saw the patient): 01/13/2018    Assessment & Plan   Kaz Perkins is a 82 year old male who was admitted on 1/11/2018 with a UTI.    1. UTI  - Continue Rocephin  - Gentle IV hydration    2. Urinary retention  - Smith placed at outside ED  - Continue Flomax    3.  Metabolic encephalopathy  - Underlying dementia  - Head CT wit  - Likely due to UTI  - Should resolve with treatment of UTI  - Redirection as needed  - Fall precautions    4. " Depression  - Continue Celexa    5. R leg/hip pain  - X-ray demonstrates[MC1.1] healing[MC1.2] ORIF R femur[MC1.1] w/o obvious new fracture[MC1.2]  - Will obtain X-rays from [facility] for comparison  - Consider Ortho Consult[MC1.1]  - Ambulate with assist qid[MC1.2]    DVT Prophylaxis: Pneumatic Compression Devices  Code Status: Special Code    Disposition: Expected discharge in 1-3 days.    Prasanth Zavala  Text Page (7 am to 6 pm)    Interval History   The patient is sitting in a chair.  He is pleasantly confused.  He denies pain.    -Data reviewed today: I reviewed all new labs and imaging results over the last 24 hours. I personally reviewed no images or EKG's today.    Physical Exam   Temp: 97.6  F (36.4  C) Temp src: Oral BP: 158/77 Pulse: 52   Resp: 16 SpO2: 96 % O2 Device: None (Room air)    Vitals:    01/11/18 0527   Weight: 66.4 kg (146 lb 4.8 oz)     Vital Signs with Ranges  Temp:  [97.6  F (36.4  C)-97.8  F (36.6  C)] 97.6  F (36.4  C)  Pulse:  [52-61] 52  Resp:  [16] 16  BP: (146-158)/(73-79) 158/77  SpO2:  [94 %-96 %] 96 %  I/O last 3 completed shifts:  In: 2020 [P.O.:240; I.V.:1780]  Out: 2900 [Urine:2900]    Gen: Well nourished, elderly male, alert and oriented x 1, no acute distressed  HEENT: Atraumatic, normocephalic  Lungs: Clear to ausculation without wheezes, rhonchi, or rales  Heart: Regular rate and rhythm, no gallops or rubs, 2/6 PHILLIP  GI: Bowel sound normal, no hepatosplenomegaly or masses  Lymph: No lymphadenopathy or edema  Skin: No rashes     Medications     NaCl 75 mL/hr at 01/13/18 0921       acetaminophen (TYLENOL) tablet 1,000 mg  1,000 mg Oral TID     cefTRIAXone  1 g Intravenous Q24H     tamsulosin  0.4 mg Oral Daily     citalopram  20 mg Oral Daily       Data     Recent Labs  Lab 01/13/18  0807 01/12/18  0800   WBC 6.3 7.8   HGB 13.4 12.4*   MCV 94 94    221     --    POTASSIUM 4.2  --    CHLORIDE 105  --    CO2 30  --    BUN 16  --    CR 1.06  --    ANIONGAP 5   --    EBONIE 8.5  --    GLC 90  --        Recent Results (from the past 24 hour(s))   XR Pelvis w Hip Right 1 View    Narrative    PELVIS WITH UNILATERAL HIP ONE VIEW RIGHT  1/12/2018 4:41 PM     HISTORY: r hip pain;     COMPARISON: None.    FINDINGS: The patient is status post internal fixation of a right  subtrochanteric femur fracture. The intramedullary jeanne is in good  position. There is good alignment of the fracture fragments. Callus is  seen at the fracture site. A small amount of heterotopic bone is seen  cephalad to the greater trochanter. Catheter is present in the  bladder. Mild degenerative change in the hip joints..      Impression    IMPRESSION:   1. Patient is status post internal fixation of a right subtrochanteric  femur fracture. Callus is seen at the fracture site.  2. No acute fractures are seen.  3. Degenerative change in the hip and knee joints.    AMBER SPAIN MD[MC1.1]          Revision History        User Key Date/Time User Provider Type Action    > MC1.2 1/13/2018 10:30 AM Prasanth Zavala MD Physician Addend     MC1.1 1/13/2018 10:26 AM Prasanth Zavala MD Physician Sign            Progress Notes by Prasanth Zavala MD at 1/12/2018 11:45 AM     Author:  Prasanth Zavala MD Service:  Hospitalist Author Type:  Physician    Filed:  1/12/2018 11:52 AM Date of Service:  1/12/2018 11:45 AM Creation Time:  1/12/2018 11:45 AM    Status:  Signed :  Prasanth Zavala MD (Physician)         [facility]    Hospitalist Progress Note    Date of Service (when I saw the patient): 01/12/2018    Assessment & Plan   Kaz Perkins is a 82 year old male who was admitted on 1/11/2018 with a UTI.    1. UTI  - Continue Rocephin  - Gentle IV hydration    2. Urinary retention  - Smith placed at outside ED  - Continue Flomax    3.  Metabolic encephalopathy  - Underlying dementia  - Head CT witjh  - Likely due to UTI  - Should resolve with treatment of UTI  - Redirection as  needed  - Fall precautions    4. Depression  - Continue Celexa    DVT Prophylaxis: Pneumatic Compression Devices  Code Status: Special Code    Disposition: Expected discharge in 1-3 days.    Prasanth Zavala  Text Page (7 am to 6 pm)    Interval History   The patient is resting in bed.  He is pleasantly confused.  He denies pain.    -Data reviewed today: I reviewed all new labs and imaging results over the last 24 hours. I personally reviewed no images or EKG's today.    Physical Exam   Temp: 98.2  F (36.8  C) Temp src: Oral BP: 140/83 Pulse: 54   Resp: 18 SpO2: 95 % O2 Device: None (Room air)    Vitals:    01/11/18 0527   Weight: 66.4 kg (146 lb 4.8 oz)     Vital Signs with Ranges  Temp:  [98.2  F (36.8  C)-99  F (37.2  C)] 98.2  F (36.8  C)  Pulse:  [54-67] 54  Resp:  [18] 18  BP: (140-152)/(70-83) 140/83  SpO2:  [94 %-96 %] 95 %  I/O last 3 completed shifts:  In: 2099 [P.O.:180; I.V.:1919]  Out: 2150 [Urine:2150]    Gen: Well nourished, elderly male, alert and oriented x 1, no acute distressed  HEENT: Atraumatic, normocephalic  Lungs: Clear to ausculation without wheezes, rhonchi, or rales  Heart: Regular rate and rhythm, no gallops or rubs, 2/6 PHILLIP  GI: Bowel sound normal, no hepatosplenomegaly or masses  Lymph: No lymphadenopathy or edema  Skin: No rashes     Medications     NaCl 75 mL/hr at 01/12/18 0604       acetaminophen (TYLENOL) tablet 1,000 mg  1,000 mg Oral TID     cefTRIAXone  1 g Intravenous Q24H     tamsulosin  0.4 mg Oral Daily     citalopram  20 mg Oral Daily       Data     Recent Labs  Lab 01/12/18  0800   WBC 7.8   HGB 12.4*   MCV 94          No results found for this or any previous visit (from the past 24 hour(s)).[MC1.1]       Revision History        User Key Date/Time User Provider Type Action    > MC1.1 1/12/2018 11:52 AM Prasanth Zavala MD Physician Sign                  Procedure Notes     No notes of this type exist for this encounter.         Progress Notes - Therapies  (Notes from 01/12/18 through 01/15/18)      Progress Notes by Whitney Melgar PT at 1/13/2018  3:31 PM     Author:  Whitney Melgar PT Service:  (none) Author Type:  Physical Therapist    Filed:  1/13/2018  3:31 PM Date of Service:  1/13/2018  3:31 PM Creation Time:  1/13/2018  3:31 PM    Status:  Signed :  Whitney Melgar PT (Physical Therapist)          01/13/18 1058   Quick Adds   Type of Visit Initial PT Evaluation   Living Environment   Lives With alone   Living Arrangements assisted living   Home Accessibility no concerns   Transportation Available family or friend will provide   Self-Care   Dominant Hand right   Usual Activity Tolerance good   Current Activity Tolerance fair   Regular Exercise no   Equipment Currently Used at Home walker, rolling   Functional Level Prior   Ambulation 1-->assistive equipment   Transferring 0-->independent   Toileting 0-->independent   Bathing 2-->assistive person   Dressing 0-->independent   Eating 0-->independent   Communication 0-->understands/communicates without difficulty   Swallowing 0-->swallows foods/liquids without difficulty   Cognition 1 - attention or memory deficits   Fall history within last six months yes   Number of times patient has fallen within last six months 2   Which of the above functional risks had a recent onset or change? ambulation;transferring;toileting;bathing;dressing   Prior Functional Level Comment Per daughter report, pt would sometimes use FWW within apartment and other times would ambulate without AD. [facility] provides meals, cleaning services, and sets up showers but pt is able to dress/undress and use toilet IND. Family provides transportation and does laundry. Pt always uses FWW in halls/for distance.   General Information   Onset of Illness/Injury or Date of Surgery - Date 01/11/18   Referring Physician Prasanth Zavala MD   Patient/Family Goals Statement Return to [facility]   Pertinent History of Current Problem (include personal  factors and/or comorbidities that impact the POC) Pt is an 83 yo male admitted due to acute metabolic encephalopathy secondary to urinary tract infection. PMH significant for dementia and past falls.   Precautions/Limitations fall precautions   Weight-Bearing Status - LUE full weight-bearing   Weight-Bearing Status - RUE full weight-bearing   Weight-Bearing Status - LLE full weight-bearing   Weight-Bearing Status - RLE weight-bearing as tolerated  (per ortho consult)   General Observations Pt supine in bed upon PT arrival. Pt confused and unreliable historian regarding living environment and prior activities he had done today.   General Info Comments Activity: Up with Assist   Cognitive Status Examination   Orientation person   Level of Consciousness confused   Follows Commands and Answers Questions 75% of the time;able to follow single-step instructions   Personal Safety and Judgment at risk behaviors demonstrated   Memory impaired  (Could not recall the MD visit about 15 minutes prior to PT)   Pain Assessment   Patient Currently in Pain No   Integumentary/Edema   Integumentary/Edema Comments Upon arrival, PT noted small skin tear on LLE just below lateral knee   Posture    Posture Forward head position;Protracted shoulders;Kyphosis   Range of Motion (ROM)   ROM Comment BLE grossly WFL with no pain reported   Strength   Strength Comments Able to move BLE against gravity, and able to complete bridge in supine   Bed Mobility   Bed Mobility Comments Pt completes supine <>sit with CGA. Pt reported dizziness upon sitting. BP stable and pt able to continue with appt.   Transfer Skills   Transfer Comments Pt completes sit to stand from bed with minAx1 to FWW, stand to sit to bed CGA with FWW.   Gait   Gait Comments Pt ambulated 10' within room with FWW and minAx2. Pt demonstrates slightly antalgic gait and requires assistance with FWW management within room setting.    Balance   Balance Comments Pt demonstrates forward  "sway with righting reactions while standing within FWW but not holding on. Could maintain with eyes open for 30 seconds with no UE support.     Sensory Examination   Sensory Perception Comments Pt denied any changes in sensation in BLE.   Coordination   Coordination Comments Pt demonstrated ataxic movements of LLE during shin slide, normal RLE. Pt could complete each finger to thumb Bilaterally WNL   General Therapy Interventions   Planned Therapy Interventions balance training;bed mobility training;gait training;neuromuscular re-education;ROM;strengthening;stretching;transfer training;home program guidelines;progressive activity/exercise   Clinical Impression   Criteria for Skilled Therapeutic Intervention yes, treatment indicated   PT Diagnosis Impaired gait   Influenced by the following impairments BLE weakness, decreased coordination, confusion   Functional limitations due to impairments Decreased activity tolerance, need for increased assistance during ambulation   Clinical Presentation Evolving/Changing   Clinical Presentation Rationale Shakiness, increased confusion   Clinical Decision Making (Complexity) Low complexity   Therapy Frequency` daily   Predicted Duration of Therapy Intervention (days/wks) 4 days   Anticipated Discharge Disposition Transitional Care Facility;Long Term Care Facility   Risk & Benefits of therapy have been explained Yes   Patient, Family & other staff in agreement with plan of care Yes   [facility] TM \"6 Clicks\"   2016, Trustees of [facility], under license to [employer].  All rights reserved.   6 Clicks Short Forms Basic Mobility Inpatient Short Form   [facility]  \"6 Clicks\" V.2 Basic Mobility Inpatient Short Form   1. Turning from your back to your side while in a flat bed without using bedrails? 3 - A Little   2. Moving from lying on your back to sitting on the side of a flat bed without using bedrails? 3 - A Little   3. Moving to and from a " bed to a chair (including a wheelchair)? 3 - A Little   4. Standing up from a chair using your arms (e.g., wheelchair, or bedside chair)? 3 - A Little   5. To walk in hospital room? 3 - A Little   6. Climbing 3-5 steps with a railing? 2 - A Lot   Basic Mobility Raw Score (Score out of 24.Lower scores equate to lower levels of function) 17   Total Evaluation Time   Total Evaluation Time (Minutes) 13[KJ1.1]        Revision History        User Key Date/Time User Provider Type Action    > KJ1.1 1/13/2018  3:31 PM Whitney Melgar, PT Physical Therapist Sign

## 2018-01-11 NOTE — PLAN OF CARE
Problem: Patient Care Overview  Goal: Plan of Care/Patient Progress Review  Outcome: No Change  Pt oriented to self only. VSS on RA. Pt denies pain. LS clear. Smith patent with adequate output. IVF running at 100 ml/hr. On IV rocephin. Up assist of 1-2, IND at baseline. Will continue to monitor.

## 2018-01-11 NOTE — IP AVS SNAPSHOT
[place] [address] [facility]: [phone]                                              INTERAGENCY TRANSFER FORM - PHYSICIAN ORDERS   2018                    Hospital Admission Date: 2018  ARELIS ALLAN   : 1935  Sex: Male        Attending Provider: Prasanth Zavala MD     Allergies:  Oxycodone    Infection:  None   Service:  INTERNAL MED    Ht:  --   Wt:  66.4 kg (146 lb 4.8 oz)   Admission Wt:  66.4 kg (146 lb 4.8 oz)    BMI:  --   BSA:  --            Patient PCP Information     Provider PCP Type    Physician No Ref-Primary General      ED Clinical Impression     Diagnosis Description Comment Added By Time Added    Other constipation [K59.09] Other constipation [K59.09]  Valdez Mclaughlin DO 1/15/2018  9:28 AM    Hydronephrosis with urinary obstruction due to ureteral calculus [N13.2] Hydronephrosis with urinary obstruction due to ureteral calculus [N13.2]  Valdez Mclaughlin DO 1/15/2018  9:28 AM    Urinary tract infection with hematuria, site unspecified [N39.0, R31.9] Urinary tract infection with hematuria, site unspecified [N39.0, R31.9]  Valdez Mclaughlin DO 1/15/2018  9:33 AM      Hospital Problems as of 1/15/2018              Priority Class Noted POA    Encephalopathy Medium  2018 Yes    Dementia Medium  2018 Unknown    Urinary tract infection Medium  2018 Unknown    Urethral stenosis Medium  2018 Unknown    Agitation Medium  2018 Yes    UTI (urinary tract infection) Medium  2018 Yes      Non-Hospital Problems as of 1/15/2018              Priority Class Noted    iamSPRAIN ROTATOR CUFF Medium  7/3/2006      Code Status History     Date Active Date Inactive Code Status Order ID Comments User Context    1/15/2018  9:31 AM  Special Code [id number] CPR OK. But do not intubate  (per POLST) Valdez Mclaughlin DO Outpatient    2018  5:58 AM 1/15/2018  9:31 AM Special Code [id number] CPR OK. But do not  intubate  (per POLST) Yobany Santillan MD Inpatient    1/11/2018  5:21 AM 1/11/2018  5:58 AM Full Code [id number]  Yobany Santillan MD Inpatient         Medication Review      START taking        Dose / Directions Comments    amoxicillin-clavulanate 500-125 MG per tablet   Commonly known as:  AUGMENTIN        Dose:  1 tablet   Take 1 tablet by mouth 2 times daily for 1 day   Quantity:  2 tablet   Refills:  0        senna-docusate 8.6-50 MG per tablet   Commonly known as:  SENOKOT-S;PERICOLACE   Used for:  Other constipation        Dose:  1 tablet   Take 1 tablet by mouth 2 times daily as needed for constipation   Quantity:  100 tablet   Refills:  0          CONTINUE these medications which have NOT CHANGED        Dose / Directions Comments    CITALOPRAM HYDROBROMIDE PO        Dose:  20 mg   Take 20 mg by mouth daily   Refills:  0        TAMSULOSIN HCL PO        Dose:  0.4 mg   Take 0.4 mg by mouth every morning After breakfast   Refills:  0        TYLENOL PO        Dose:  1000 mg   Take 1,000 mg by mouth 3 times daily Do not exceed 4000 mg po in 24 hours.   Refills:  0        VITAMIN D (CHOLECALCIFEROL) PO        Dose:  1000 Units   Take 1,000 Units by mouth daily   Refills:  0                  Further instructions from your care team       Discharge to [facility], phone [phone]    Summary of Visit     Reason for your hospital stay       Urinary obstruction             After Care     Activity - Up with nursing assistance           Advance Diet as Tolerated       Follow this diet upon discharge: Orders Placed This Encounter      Advance Diet as Tolerated: Regular Diet Adult       Smith catheter       To straight gravity drainage. Change catheter every 2 weeks and PRN for leaking or decreased uring output with signs of bladder distention. DO NOT change catheter without a specific MD order IF diagnosis of benign prostatic hypertrophy (BPH), neurogenic bladder, or other urological conditions        General info for SNF       Length of Stay Estimate: Short Term Care: Estimated # of Days <30  Condition at Discharge: Improving  Level of care:skilled   Rehabilitation Potential: Fair  Admission H&P remains valid and up-to-date: Yes  Recent Chemotherapy: N/A  Use Nursing Home Standing Orders: N/A       Mantoux instructions       Give two-step Mantoux (PPD) Per Facility Policy Yes             Referrals     Occupational Therapy Adult Consult       Evaluate and treat as clinically indicated.    Reason:  Deconditioning, recent hip fracture       Physical Therapy Adult Consult       Evaluate and treat as clinically indicated.    Reason:  Deconditioning, recent hip fracture             Follow-Up Appointment Instructions     Future Labs/Procedures    Follow Up and recommended labs and tests     Comments:    Follow up with Nursing home physician.  No follow up labs or test are needed.    Follow up with urologist, Dr. Michel in 1-2 weeks for urinary obstruction and to work on alicia removal      Follow-Up Appointment Instructions     Follow Up and recommended labs and tests       Follow up with Nursing home physician.  No follow up labs or test are needed.    Follow up with urologist, Dr. Michel in 1-2 weeks for urinary obstruction and to work on alicia removal             Statement of Approval     Ordered          01/15/18 0933  I have reviewed and agree with all the recommendations and orders detailed in this document.  EFFECTIVE NOW     Approved and electronically signed by:  Valdez Mclaughlin DO

## 2018-01-11 NOTE — IP AVS SNAPSHOT
MRN:[id number]                      After Visit Summary   1/11/2018    Kaz Perkins    MRN: [id number]           Thank you!     Thank you for choosing [facility] for your care. Our goal is always to provide you with excellent care. Hearing back from our patients is one way we can continue to improve our services. Please take a few minutes to complete the written survey that you may receive in the mail after you visit with us. Thank you!        Patient Information     Date Of Birth          1935        Designated Caregiver       Most Recent Value    Caregiver    Will someone help with your care after discharge? no      About your hospital stay     You were admitted on:  January 11, 2018 You last received care in the:  [place] [address] [facility]    You were discharged on:  January 15, 2018        Reason for your hospital stay       Urinary obstruction                  Who to Call     For medical emergencies, please call 911.  For non-urgent questions about your medical care, please call your primary care provider or clinic, None          Attending Provider     Provider Specialty    Valdez Shin MD Internal Medicine    Yobany Santillan MD Internal Medicine    [facility], Jerrod Moser MD Internal Medicine    Comsherrell, Prasanth Espinosa MD Internal Medicine       Primary Care Provider Fax #    Physician No Ref-Primary [phone]      After Care Instructions     Activity - Up with nursing assistance           Advance Diet as Tolerated       Follow this diet upon discharge: Orders Placed This Encounter      Advance Diet as Tolerated: Regular Diet Adult            Smith catheter       To straight gravity drainage. Change catheter every 2 weeks and PRN for leaking or decreased uring output with signs of bladder distention. DO NOT change catheter without a specific MD order IF diagnosis of benign prostatic hypertrophy (BPH), neurogenic bladder, or other urological  conditions            General info for SNF       Length of Stay Estimate: Short Term Care: Estimated # of Days <30  Condition at Discharge: Improving  Level of care:skilled   Rehabilitation Potential: Fair  Admission H&P remains valid and up-to-date: Yes  Recent Chemotherapy: N/A  Use Nursing Home Standing Orders: N/A            Mantoux instructions       Give two-step Mantoux (PPD) Per Facility Policy Yes                  Follow-up Appointments     Follow Up and recommended labs and tests       Follow up with Nursing home physician.  No follow up labs or test are needed.    Follow up with urologist, Dr. Michel in 1-2 weeks for urinary obstruction and to work on alicia removal                  Additional Services     Occupational Therapy Adult Consult       Evaluate and treat as clinically indicated.    Reason:  Deconditioning, recent hip fracture            Physical Therapy Adult Consult       Evaluate and treat as clinically indicated.    Reason:  Deconditioning, recent hip fracture                  Further instructions from your care team       Discharge to [facility], phone [phone]    Pending Results     No orders found from 1/9/2018 to 1/12/2018.            Statement of Approval     Ordered          01/15/18 0933  I have reviewed and agree with all the recommendations and orders detailed in this document.  EFFECTIVE NOW     Approved and electronically signed by:  Valdez Mclaughlin,              Admission Information     Date & Time Provider Department Dept. Phone    1/11/2018 Prasanth Zavala MD [place] [address] [facility] [phone]      Your Vitals Were     Blood Pressure Pulse Temperature Respirations Weight Pulse Oximetry    129/76 (BP Location: Right arm) 70 97.8  F (36.6  C) (Oral) 18 66.4 kg (146 lb 4.8 oz) 95%      MyChart Information     [employer] lets you send messages to your doctor, view your test results, renew your prescriptions, schedule  "appointments and more. To sign up, go to [email or web address] . Click on \"Log in\" on the left side of the screen, which will take you to the Welcome page. Then click on \"Sign up Now\" on the right side of the page.     You will be asked to enter the access code listed below, as well as some personal information. Please follow the directions to create your username and password.     Your access code is: [email or web address]  Expires: 4/15/2018  3:24 PM     Your access code will  in 90 days. If you need help or a new code, please call your [facility] clinic or [phone].        Care EveryWhere ID     This is your Care EveryWhere ID. This could be used by other organizations to access your [facility] medical records  [id number]        Equal Access to Services     MARCELLA SOLIS : Cathy Duvall, john ordonez, luci hernandez, nadine hansen . So [facility] [phone].    ATENCIÓN: Si habla español, tiene a espinoza disposición servicios gratuitos de asistencia lingüística. Katherine al [phone].    We comply with applicable federal civil rights laws and [place] laws. We do not discriminate on the basis of race, color, national origin, age, disability, sex, sexual orientation, or gender identity.               Review of your medicines      START taking        Dose / Directions    amoxicillin-clavulanate 500-125 MG per tablet   Commonly known as:  AUGMENTIN        Dose:  1 tablet   Take 1 tablet by mouth 2 times daily for 1 day   Quantity:  2 tablet   Refills:  0       senna-docusate 8.6-50 MG per tablet   Commonly known as:  SENOKOT-S;PERICOLACE   Used for:  Other constipation        Dose:  1 tablet   Take 1 tablet by mouth 2 times daily as needed for constipation   Quantity:  100 tablet   Refills:  0         CONTINUE these medicines which have NOT CHANGED        Dose / Directions    CITALOPRAM HYDROBROMIDE PO        Dose:  20 mg   Take 20 mg by mouth daily   Refills:  0    "    TAMSULOSIN HCL PO        Dose:  0.4 mg   Take 0.4 mg by mouth every morning After breakfast   Refills:  0       TYLENOL PO        Dose:  1000 mg   Take 1,000 mg by mouth 3 times daily Do not exceed 4000 mg po in 24 hours.   Refills:  0       VITAMIN D (CHOLECALCIFEROL) PO        Dose:  1000 Units   Take 1,000 Units by mouth daily   Refills:  0            Where to get your medicines      Some of these will need a paper prescription and others can be bought over the counter. Ask your nurse if you have questions.     You don't need a prescription for these medications     amoxicillin-clavulanate 500-125 MG per tablet    senna-docusate 8.6-50 MG per tablet               ANTIBIOTIC INSTRUCTION     You've Been Prescribed an Antibiotic - Now What?  Your healthcare team thinks that you or your loved one might have an infection. Some infections can be treated with antibiotics, which are powerful, life-saving drugs. Like all medications, antibiotics have side effects and should only be used when necessary. There are some important things you should know about your antibiotic treatment.      Your healthcare team may run tests before you start taking an antibiotic.    Your team may take samples (e.g., from your blood, urine or other areas) to run tests to look for bacteria. These test can be important to determine if you need an antibiotic at all and, if you do, which antibiotic will work best.      Within a few days, your healthcare team might change or even stop your antibiotic.    Your team may start you on an antibiotic while they are working to find out what is making you sick.    Your team might change your antibiotic because test results show that a different antibiotic would be better to treat your infection.    In some cases, once your team has more information, they learn that you do not need an antibiotic at all. They may find out that you don't have an infection, or that the antibiotic you're taking won't work  against your infection. For example, an infection caused by a virus can't be treated with antibiotics. Staying on an antibiotic when you don't need it is more likely to be harmful than helpful.      You may experience side effects from your antibiotic.    Like all medications, antibiotics have side effects. Some of these can be serious.    Let you healthcare team know if you have any known allergies when you are admitted to the hospital.    One significant side effect of nearly all antibiotics is the risk of severe and sometimes deadly diarrhea caused by Clostridium difficile (C. Difficile). This occurs when a person takes antibiotics because some good germs are destroyed. Antibiotic use allows C. diificile to take over, putting patients at high risk for this serious infection.    As a patient or caregiver, it is important to understand your or your loved one's antibiotic treatment. It is especially important for caregivers to speak up when patients can't speak for themselves. Here are some important questions to ask your healthcare team.    What infection is this antibiotic treating and how do you know I have that infection?    What side effects might occur from this antibiotic?    How long will I need to take this antibiotic?    Is it safe to take this antibiotic with other medications or supplements (e.g., vitamins) that I am taking?     Are there any special directions I need to know about taking this antibiotic? For example, should I take it with food?    How will I be monitored to know whether my infection is responding to the antibiotic?    What tests may help to make sure the right antibiotic is prescribed for me?      Information provided by:  www.cdc.gov/getsmart  U.S. Department of Health and Human Services  Centers for disease Control and Prevention  National Center for Emerging and Zoonotic Infectious Diseases  Division of Healthcare Quality Promotion         Protect others around you: Learn how to  safely use, store and throw away your medicines at www.disposemymeds.org.             Medication List: This is a list of all your medications and when to take them. Check marks below indicate your daily home schedule. Keep this list as a reference.      Medications           Morning Afternoon Evening Bedtime As Needed    amoxicillin-clavulanate 500-125 MG per tablet   Commonly known as:  AUGMENTIN   Take 1 tablet by mouth 2 times daily for 1 day                                CITALOPRAM HYDROBROMIDE PO   Take 20 mg by mouth daily   Last time this was given:  20 mg on 1/15/2018  8:27 AM                                senna-docusate 8.6-50 MG per tablet   Commonly known as:  SENOKOT-S;PERICOLACE   Take 1 tablet by mouth 2 times daily as needed for constipation   Last time this was given:  1 tablet on 1/13/2018  9:21 AM                                TAMSULOSIN HCL PO   Take 0.4 mg by mouth every morning After breakfast   Last time this was given:  0.4 mg on 1/15/2018  8:27 AM                                TYLENOL PO   Take 1,000 mg by mouth 3 times daily Do not exceed 4000 mg po in 24 hours.   Last time this was given:  1,000 mg on 1/15/2018  4:21 PM                                VITAMIN D (CHOLECALCIFEROL) PO   Take 1,000 Units by mouth daily

## 2018-01-11 NOTE — PROGRESS NOTES
Care Coordinator met with pt and his Daughter Vicki concerning Observation status and she was given an Observation brochure.  Vicki is a nurse in the GYN clinic.  Pt suffers from dementia, so was not able to really participate in the conversation.  Pt resides at the [facility].  He is not in there memory care.  Pt went to [facility] in their memory care after his hip surgery in September.  If he needed a TCU, this is where Vicki would want him.  She is aware that it would be private pay (pending what his insurance is, Registration has not entered this information yet) due to Observation status.  Vicki noted that he would not be able to discharge with a alicia catheter, as they are not able to manage this.  His retention issues in the past resolved once he was put of Flomax.  Due to time of day, was not able to call his facility.

## 2018-01-11 NOTE — IP AVS SNAPSHOT
` ` Patient Information     Patient Name Sex     Kaz Powell ([id number]) Male 1935       Room Bed    19 6619-02      Patient Demographics     Address Phone    [address] [place] [address] [phone] (Home)      Patient Ethnicity & Race     Ethnic Group Patient Race    American Choose not to answer      Emergency Contact(s)     Name Relation Home Work Mobile    lorraine powell Daughter   [phone]    Remigio Powell Son   [phone]      Documents on File        Status Date Received Description       Documents for the Patient    Privacy Notice - [place] Received 18     Consent Form  06     Insurance Card  06     Other  06     Waiver - Payment  06     Affiliate Privacy placeholder   phase3    Consent for EHR Access Received 18     External Medication Information Consent       Patient ID Received 18 Patient did not have photo ID    [facility] Specified Other       Consent for Services/Privacy Notice - Hospital/Clinic Received 18     Care Everywhere Prospective Auth Received 18        Documents for the Encounter    CMS IM for Patient Signature Received 18     Observation Notice Received 18     CMS ADAM for Patient Signature Received 18     CMS IM for Patient Signature Received 01/15/18   2MM      Admission Information     Attending Provider Admitting Provider Admission Type Admission Date/Time    Prasanth Zavala MD Comnick, Mark Brian, MD Urgent 18  0516    Discharge Date Hospital Service Auth/Cert Status Service Area     Internal Medicine [facility] SERVICES    Unit Room/Bed Admission Status       [address] UNIT 19/6619-02 Admission (Confirmed)       Admission     Complaint    uti, Encephalopathy, Encephalopathy, Agitation, UTI (urinary tract infection)      Hospital Account     Name Acct ID Class Status Primary Coverage    Kaz Powell [id number] Inpatient Open  MEDICARE - MEDICARE FOR HB SUPPLEMENT            Guarantor Account (for Hospital Account #[id number])     Name Relation to Pt Service Area Active? Acct Type    Kaz Perkins  FCS Yes Personal/Family    Address Phone          [address] [place] [address] [phone](H)              Coverage Information (for Hospital Account #[id number])     1. MEDICARE/MEDICARE FOR HB SUPPLEMENT     F/O Payor/Plan Precert #    MEDICARE/MEDICARE FOR HB SUPPLEMENT     Subscriber Subscriber #    Kaz Perkins [id number]    Address Phone    ATTN CLAIMS  [address]  [place] [address] [phone]          2. MEDICA/MEDICA PRIME SOLUTION     F/O Payor/Plan Precert #    MEDICA/MEDICA PRIME SOLUTION     Subscriber Subscriber #    Kaz Perkins [id number]    Address Phone    [address] [address]  [place] [address] [phone]

## 2018-01-11 NOTE — PROGRESS NOTES
"Covering hospitalist    S -- admitted with agitation, has uti and prior noted urethral stricture. He denies frequent urination or dysuria, but he is not reliable.   O -- /79 (BP Location: Right arm)  Pulse 67  Temp 98.8  F (37.1  C) (Oral)  Resp 16  Wt 66.4 kg (146 lb 4.8 oz)  SpO2 96%   Lungs -- clear  CV -- reg S1S2  Extr -- no edema  Neuro -- alert, Ox1, did not know he was in the hospital, when asked the date he looked at his watch and said \"It's the 3rd month\", and at 7:30 AM he thought it was nighttime.      Lab -- UA with 25-50 WBC's, also WBC clumps             UC -- pending at [facility]   WBC 13.7 with left shift    Impression   Active Problems:    Encephalopathy -- increased confusion related to UTI   -- unable to manage at home    Dementia -- baseline, unclear how bad his dementia is     Urinary tract infection    Urethral stenosis    Agitation      Plan -- IV fluids, on antibiotics(Ceftrixone) await cultures, trazodone prn and at bedtime.  Is full code -- left message with wife to call and discuss it and to see what his baseline function is.  Discussed with Utilization review -- advised Observation for now;  Order placed.    Jerrod Brito  Pager: [phone]  Cell Phone:  [phone]   "

## 2018-01-11 NOTE — IP AVS SNAPSHOT
[place] [address] MED[facility] SPECIALTY UNIT: [phone]                                              INTERAGENCY TRANSFER FORM - LAB / IMAGING / EKG / EMG RESULTS   2018                    Hospital Admission Date: 2018  ARELIS ALLAN   : 1935  Sex: Male        Attending Provider: Prasanth Zavala MD     Allergies:  Oxycodone    Infection:  None   Service:  INTERNAL MED    Ht:  --   Wt:  66.4 kg (146 lb 4.8 oz)   Admission Wt:  66.4 kg (146 lb 4.8 oz)    BMI:  --   BSA:  --            Patient PCP Information     Provider PCP Type    Physician No Ref-Primary General         Lab Results - 3 Days      Basic metabolic panel [[id number]]  Resulted: 1846, Result status: Final result    Ordering provider: Prasanth Zavala MD  18 0001 Resulting lab: [facility]    Specimen Information    Type Source Collected On   Blood  18 0807          Components       Value Reference Range Flag Lab   Sodium 140 133 - 144 mmol/L  FrStHsLb   Potassium 4.2 3.4 - 5.3 mmol/L  FrStHsLb   Chloride 105 94 - 109 mmol/L  FrStHsLb   Carbon Dioxide 30 20 - 32 mmol/L  FrStHsLb   Anion Gap 5 3 - 14 mmol/L  FrStHsLb   Glucose 90 70 - 99 mg/dL  FrStHsLb   Urea Nitrogen 16 7 - 30 mg/dL  FrStHsLb   Creatinine 1.06 0.66 - 1.25 mg/dL  FrStHsLb   GFR Estimate 67 >60 mL/min/1.7m2  FrStHsLb   Comment:  Non  GFR Calc   GFR Estimate If Black 81 >60 mL/min/1.7m2  FrStHsLb   Comment:  African American GFR Calc   Calcium 8.5 8.5 - 10.1 mg/dL  FrStHsLb            CBC with platelets [[id number]] (Abnormal)  Resulted: 18, Result status: Final result    Ordering provider: Prasanth Zavala MD  18 0001 Resulting lab: [facility]    Specimen Information    Type Source Collected On   Blood  18 0807          Components       Value Reference Range Flag Lab   WBC 6.3 4.0 - 11.0 10e9/L  FrStHsLb   RBC Count 4.24 4.4 - 5.9 10e12/L L FrStHsLb    Hemoglobin 13.4 13.3 - 17.7 g/dL  FrStHsLb   Hematocrit 39.8 40.0 - 53.0 % L FrStHsLb   MCV 94 78 - 100 fl  FrStHsLb   MCH 31.6 26.5 - 33.0 pg  FrStHsLb   MCHC 33.7 31.5 - 36.5 g/dL  FrStHsLb   RDW 14.0 10.0 - 15.0 %  FrStHsLb   Platelet Count 201 150 - 450 10e9/L  FrStHsLb            CBC with platelets [[id number]] (Abnormal)  Resulted: 01/12/18 0828, Result status: Final result    Ordering provider: Yobany Santillan MD  01/12/18 0000 Resulting lab: [facility]    Specimen Information    Type Source Collected On   Blood  01/12/18 0800          Components       Value Reference Range Flag Lab   WBC 7.8 4.0 - 11.0 10e9/L  FrStHsLb   RBC Count 3.95 4.4 - 5.9 10e12/L L FrStHsLb   Hemoglobin 12.4 13.3 - 17.7 g/dL L FrStHsLb   Hematocrit 37.2 40.0 - 53.0 % L FrStHsLb   MCV 94 78 - 100 fl  FrStHsLb   MCH 31.4 26.5 - 33.0 pg  FrStHsLb   MCHC 33.3 31.5 - 36.5 g/dL  FrStHsLb   RDW 14.3 10.0 - 15.0 %  FrStHsLb   Platelet Count 221 150 - 450 10e9/L  FrStHsLb            Testing Performed By     Lab - Abbreviation Name Director Address Valid Date Range    14 - FrStHsLb [facility] Unknown [address] [place] [address] 05/08/15 1057 - Present            Unresulted Labs     None         Imaging Results - 3 Days      XR Pelvis w Hip Right 1 View [[id number]]  Resulted: 01/12/18 1932, Result status: Final result    Ordering provider: Prasanth Zavala MD  01/12/18 1542 Resulted by: Ivan Alonso MD    Performed: 01/12/18 1638 - 01/12/18 1641 Resulting lab: RADIOLOGY RESULTS    Narrative:       PELVIS WITH UNILATERAL HIP ONE VIEW RIGHT  1/12/2018 4:41 PM     HISTORY: r hip pain;     COMPARISON: None.    FINDINGS: The patient is status post internal fixation of a right  subtrochanteric femur fracture. The intramedullary jeanne is in good  position. There is good alignment of the fracture fragments. Callus is  seen at the fracture site. A small amount of heterotopic bone is seen  cephalad to  the greater trochanter. Catheter is present in the  bladder. Mild degenerative change in the hip joints..      Impression:       IMPRESSION:   1. Patient is status post internal fixation of a right subtrochanteric  femur fracture. Callus is seen at the fracture site.  2. No acute fractures are seen.  3. Degenerative change in the hip and knee joints.    AMBER SPAIN MD      Testing Performed By     Lab - Abbreviation Name Director Address Valid Date Range    104 - Rad Rslts RADIOLOGY RESULTS Unknown Unknown 02/16/05 1553 - Present            Encounter-Level Documents:     There are no encounter-level documents.      Order-Level Documents:     There are no order-level documents.

## 2018-01-11 NOTE — H&P
DATE OF SERVICE:  01/11/2018      CHIEF COMPLAINT:  Confusion and dizziness.      HISTORY OF PRESENT ILLNESS:  Mr. Kaz Perkins is a pleasant 82-year-old gentleman with dementia who lives in an assisted living facility who initially presented to the [facility] Emergency Department with his son because of dizziness and confusion that occurred today at his assisted living.  According to the son who is his primary historian the patient had developed some nausea and vomiting during the evening which resulted in a fall.  At that point he decided to take the patient to the Emergency Department.  He was seen in the [facility] Department and while there underwent routine labs as well as a CT scan of the head due to the fall and confusion.  The head CT was read as negative for any acute intracranial abnormalities, but he does have some age-related brain atrophy and evidence of chronic small vessel ischemic disease.  The patient was noted to have a large amount of leukocyte esterase, trace amount of occult blood that was suspicious for a urinary tract infection.  Additionally, there is some associated leukocytosis in the patient's peripheral blood and based on these findings, the ER physician there started the patient on ceftriaxone.  Also noteworthy he had a mild elevation of his lactate at 2.1 and had elevated BUN to creatinine ratio of 22-1.06 and as such, he was given IV fluids as well.  There were no beds in the [facility] and as such, the patient was transferred to [facility] where I met with the patient and his son on the floor.  The patient himself is fairly confused at this time and is more confused than his baseline according to the son.  He is not really able to provide any history for me.  He is pleasant and conversant, but does not recall any of the events of the evening.  Per the son, he has dementia but is normally not this out of sorts.  The son provides the rest  of history for me.  There have not been any definite fevers, he has not been short of breath or any chest pain, no diarrhea and he has not had any recent blood in the stools.  They did place a Smith catheter at the [facility] facility due to the patient's urinary retention and that remains in place at this time with now clear urine output.      The son denies any history of any heart disease, heart failure or stroke, seizure, clotting or bleeding disorders or thyroid disease or diabetes.      PAST MEDICAL HISTORY:   1.  Urinary retention.   2.  History of falls.   3.  History of displaced subtrochanteric fracture of the right femur in 09/2017.   4.  Dementia.      SOCIAL HISTORY:  The patient is a lifelong nonsmoker and does not drink alcohol.      FAMILY HISTORY:  Negative for heart disease or diabetes.      PRIOR TO ADMISSION MEDICATIONS:   1.  Tylenol 500 mg p.o. 2 tabs 3 times daily p.r.n.   2.  Vitamin D3 at 2000 units p.o. daily.   3.  Celexa 20 mg p.o. daily.   4.  Senokot-S 1 tablet p.o. twice daily.   5.  Flomax 0.4 mg p.o. daily.      ALLERGIES:  Hallucinations with Oxycodone.      PAST SURGICAL HISTORY:     1.  Status post ORIF of right hip in 09/2017.   2.  Status post colonoscopies in the past.      REVIEW OF SYSTEMS:  A 10-point system review was impossible for the patient, but I did inquire via his son as the surrogate historian and it was negative with the exception of those complaints noted in the HPI.      PHYSICAL EXAMINATION:   VITAL SIGNS:  Temperature 98.4, heart rate 65, blood pressure 135/78, respiratory rate 16, O2 saturation 94% on room air.   GENERAL:  This is a very pleasant, but clearly demented gentleman who is conversant but pleasantly confused.  He is lying in a hospital bed.  His son is at his bedside.   HEENT:  No facial asymmetry.  Eyes:  Pupils are equally reactive to light bilaterally, extraocular movements are intact.  Oropharynx is clear.  Tongue is midline.  Mucous membranes are a  little bit dry appearing.   RESPIRATORY:  Clear lung fields bilaterally without crackles or wheezes.   CARDIAC:  Regular rate and rhythm, S1, S2 are heard.  There is a 3/6 systolic ejection murmur best heard at the upper left sternal border.   ABDOMEN:  Soft, nontender, nondistended.  Bowel sounds are heard.   EXTREMITIES:  Lower extremity exam is negative for significant pedal edema.  Pedal pulses are about 1.5 plus bilaterally.   SKIN:  The patient has good capillary refill at this time.   NEUROLOGIC:  Cranial nerves II through XII are grossly intact on cursory exam.      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  A white blood cell count was 13.7, hemoglobin 14.6 and a platelet count was 259.  The absolute neutrophil count was 12.1.  Lactic acid was 2.1, sodium was 139, potassium 4.4, chloride 101, CO2 of 24, BUN is 22 with creatinine 1.06 and a glucose was elevated at 146, calcium 9.8.  Chest x-ray was also performed at the outside facility and read as bibasilar moderate discoid atelectasis with chronic-appearing mild compression deformity present in the lower thoracic spine.      ASSESSMENT AND PLAN:  This is an 82-year-old gentleman presenting with an acute metabolic encephalopathy secondary to urinary tract infection.      1.  Urinary tract infection.  He was initiated on ceftriaxone at the outside facility.  We will continue IV ceftriaxone 1 gram q.24 h. We will also hydrate him with IV fluids.   2.  Lactic acidosis is likely secondary to dehydration and urinary tract infection.  We will hydrate him with IV fluids.   3.  Metabolic encephalopathy.  This is  likely secondary to the urinary tract infection.  We will treat the underlying infection and monitor his clinical status.     4.  Dementia.  Normally his dementia is stable without behavioral disturbances but at present he is having some encephalopathy and dizziness.  We will hydrate him with IV fluids and redirect him.   5.  History of urinary retention.  He has a Smith catheter in place now.  He normally takes Flomax as an outpatient.  We will resume his prior to admission Flomax.   6.  Depression.  He normally uses Celexa.  We will continue this.   7.  Deep venous thrombosis prophylaxis will be pneumatic compression devices.      CODE STATUS:  I discussed with his son and reviewed a copy of the POLST form that the patient's daughter sent to the son via his telephone and according to the POLST the patient is okay with CPR but not with intubation and I have put in an order to reflect this in the chart.      DISPOSITION:  Inpatient.  I anticipate he will be here a few days.         CONSUELO LIPSCOMB MD             D: 2018 06:13   T: 2018 06:45   MT:       Name:     ARELIS ALLAN   MRN:      [id number]        Account:      [id number]   :      1935           Admitted:     [id number]      Document: [id number]

## 2018-01-11 NOTE — UTILIZATION REVIEW
"Everett Zheng  Admission Status; Secondary Review Determination     Admission Date: 1/11/2018  5:16 AM       Under the authority of the Utilization Management Committee, the utilization review process indicated a secondary review on the above patient.  The review outcome is based on review of the medical records, discussions with staff, and applying clinical experience noted on the date of the review.        ()      Inpatient Status Appropriate - This patient's medical care is consistent with medical management for inpatient care and reasonable inpatient medical practice.      (X) Observation Status Appropriate - This patient does not meet hospital inpatient criteria and is placed in observation status. If this patient's primary payer is Medicare and was admitted as an inpatient, Condition Code 44 should be used and patient status changed to \"observation\".   () Admission Status NOT Appropriate - This patient's medical care is not consistent with medical management for Inpatient or Observation Status.        () Outpatient Procedure Status Appropriate - Procedure not on Medicare Inpatient list and no complications at the time of this review       RATIONALE FOR DETERMINATION      Brief clinical presentation, information copied from the chart, abbreviated and edited for relevant content:       1/11/2018 12:08 PM (CT) Sera Jolley: Ratna Cross to change to OBS given the admitting diagnoses likely would be resolved by tomorrow (one night stay) and any further stay would likely be for disposition needs, therefore, observation would be a more appropriate admission status. He is being treated for UTI and mild lactic acidosis.           The information on this document is developed by the utilization review team in order for the business office to ensure compliance.  This only denotes the appropriateness of proper admission status and does not reflect the quality of care rendered.         The definitions of Inpatient " Status and Observation Status used in making the determination above are those provided in the CMS Coverage Manual, Chapter 1 and Chapter 6, section 70.4.      Sincerely,      Sera Jolley MD   Utilization Review/ Case Management  [facility].

## 2018-01-11 NOTE — PHARMACY-ADMISSION MEDICATION HISTORY
Admission medication history interview status for the 1/11/2018  admission is complete. See EPIC admission navigator for prior to admission medications     Medication history source reliability:Good    Actions taken by pharmacist (provider contacted, etc):None     Additional medication history information not noted on PTA med list : Information from medication list provided by [facility] [place]   [phone]    Medication reconciliation/reorder completed by provider prior to medication history? No    Time spent in this activity: 15 minutes    Prior to Admission medications    Medication Sig Last Dose Taking? Auth Provider   Acetaminophen (TYLENOL PO) Take 1,000 mg by mouth 3 times daily Do not exceed 4000 mg po in 24 hours.  Yes Unknown, Entered By History   CITALOPRAM HYDROBROMIDE PO Take 20 mg by mouth daily  Yes Unknown, Entered By History   TAMSULOSIN HCL PO Take 0.4 mg by mouth every morning After breakfast  Yes Unknown, Entered By History   VITAMIN D, CHOLECALCIFEROL, PO Take 1,000 Units by mouth daily  Yes Unknown, Entered By History

## 2018-01-11 NOTE — IP AVS SNAPSHOT
` [place] [address] MEDICAL SPECIALTY UNIT: [phone]            Medication Administration Report for Kaz Perkins as of 01/15/18 1627   Legend:    Given Hold Not Given Due Canceled Entry Other Actions    Time Time (Time) Time  Time-Action       Inactive    Active    Linked        Medications 01/09/18 01/10/18 01/11/18 01/12/18 01/13/18 01/14/18 01/15/18    acetaminophen (TYLENOL) tablet 1,000 mg  Dose: 1,000 mg Freq: 3 TIMES DAILY Route: PO  Start: 01/12/18 1600   Admin Instructions: Maximum acetaminophen dose from all sources = 75 mg/kg/day not to exceed 4 gram        1528 (1,000 mg)-Given       2148 (1,000 mg)-Given        0921 (1,000 mg)-Given       1553 (1,000 mg)-Given       2138 (1,000 mg)-Given        0847 (1,000 mg)-Given       1558 (1,000 mg)-Given       2200 (1,000 mg)-Given        0826 (1,000 mg)-Given       1621 (1,000 mg)-Given       [ ] 2200           bisacodyl (DULCOLAX) Suppository 10 mg  Dose: 10 mg Freq: DAILY PRN Route: RE  PRN Reason: constipation  Start: 01/11/18 0521   Admin Instructions: Hold for loose stools.  This is the third step of a three step constipation treatment.               cefTRIAXone (ROCEPHIN) 1 g in 10 mL SWFI Premix Syringe  Dose: 1 g Freq: EVERY 24 HOURS Route: IV  Indications of Use: URINARY TRACT INFECTION  Start: 01/11/18 0600   Admin Instructions: Give IVP over 3 minutes               0226 (1 g)-Given        0327 (1 g)-Given        0425 (1 g)-Given        0643 (1 g)-Given           citalopram (celeXA) tablet 20 mg  Dose: 20 mg Freq: DAILY Route: PO  Start: 01/11/18 0900      0812 (20 mg)-Given        0952 (20 mg)-Given        0921 (20 mg)-Given        0847 (20 mg)-Given        0827 (20 mg)-Given           magnesium hydroxide (MILK OF MAGNESIA) suspension 30 mL  Dose: 30 mL Freq: DAILY PRN Route: PO  PRN Reason: constipation  Start: 01/11/18 0521   Admin Instructions: Hold for loose stools.  This is the second step of a three step constipation  treatment.               naloxone (NARCAN) injection 0.1-0.4 mg  Dose: 0.1-0.4 mg Freq: EVERY 2 MIN PRN Route: IV  PRN Reason: opioid reversal  Start: 01/11/18 0520   Admin Instructions: For respiratory rate LESS than or EQUAL to 8.  Partial reversal dose:  0.1 mg titrated q 2 minutes for Analgesia Side Effects Monitoring Sedation Level of 3 (frequently drowsy, arousable, drifts to sleep during conversation).Full reversal dose:  0.4 mg bolus for Analgesia Side Effects Monitoring Sedation Level of 4 (somnolent, minimal or no response to stimulation).  For ordered doses up to 2mg give IVP. Give each 0.4mg over 15 seconds in emergency situations. For non-emergent situations further dilute in 9mL of NS to facilitate titration of response.               senna-docusate (SENOKOT-S;PERICOLACE) 8.6-50 MG per tablet 1 tablet  Dose: 1 tablet Freq: 2 TIMES DAILY PRN Route: PO  PRN Reason: constipation  Start: 01/11/18 0521   Admin Instructions: If no bowel movement in 24 hours, increase to 2 tablets PO.  Hold for loose stools.  This is the first step of a three step constipation treatment.        1531 (1 tablet)-Given        0921 (1 tablet)-Given            Or  senna-docusate (SENOKOT-S;PERICOLACE) 8.6-50 MG per tablet 2 tablet  Dose: 2 tablet Freq: 2 TIMES DAILY PRN Route: PO  PRN Reason: constipation  Start: 01/11/18 0521   Admin Instructions: Hold for loose stools.  This is the first step of a three step constipation treatment.                             tamsulosin (FLOMAX) capsule 0.4 mg  Dose: 0.4 mg Freq: DAILY Route: PO  Start: 01/11/18 0900   Admin Instructions: Administer 30 minutes after the same meal each day.  Capsules should be swallowed whole; do not crush chew or open.       0813 (0.4 mg)-Given        0952 (0.4 mg)-Given        0921 (0.4 mg)-Given        0847 (0.4 mg)-Given        0827 (0.4 mg)-Given          Discontinued Medications  Medications 01/09/18 01/10/18 01/11/18 01/12/18 01/13/18 01/14/18 01/15/18          Rate: 75 mL/hr Freq: CONTINUOUS Route: IV  Start: 01/11/18 0530   End: 01/14/18 1316      0557 ( )-New Bag       1425 ( )-Rate/Dose Change       1528 ( )-New Bag        0604 ( )-New Bag       1837 ( )-New Bag        0018 ( )-Rate/Dose Verify       0921 ( )-New Bag       2139 ( )-New Bag        1051 ( )-New Bag       1316-Med Discontinued

## 2018-01-12 ENCOUNTER — APPOINTMENT (OUTPATIENT)
Dept: GENERAL RADIOLOGY | Facility: CLINIC | Age: 83
DRG: 698 | End: 2018-01-12
Attending: INTERNAL MEDICINE
Payer: MEDICARE

## 2018-01-12 PROBLEM — N39.0 UTI (URINARY TRACT INFECTION): Status: ACTIVE | Noted: 2018-01-12

## 2018-01-12 LAB
ERYTHROCYTE [DISTWIDTH] IN BLOOD BY AUTOMATED COUNT: 14.3 % (ref 10–15)
HCT VFR BLD AUTO: 37.2 % (ref 40–53)
HGB BLD-MCNC: 12.4 G/DL (ref 13.3–17.7)
MCH RBC QN AUTO: 31.4 PG (ref 26.5–33)
MCHC RBC AUTO-ENTMCNC: 33.3 G/DL (ref 31.5–36.5)
MCV RBC AUTO: 94 FL (ref 78–100)
PLATELET # BLD AUTO: 221 10E9/L (ref 150–450)
RBC # BLD AUTO: 3.95 10E12/L (ref 4.4–5.9)
WBC # BLD AUTO: 7.8 10E9/L (ref 4–11)

## 2018-01-12 PROCEDURE — 12000000 ZZH R&B MED SURG/OB

## 2018-01-12 PROCEDURE — 25000128 H RX IP 250 OP 636: Performed by: INTERNAL MEDICINE

## 2018-01-12 PROCEDURE — A9270 NON-COVERED ITEM OR SERVICE: HCPCS | Mod: GY | Performed by: INTERNAL MEDICINE

## 2018-01-12 PROCEDURE — 36415 COLL VENOUS BLD VENIPUNCTURE: CPT | Performed by: INTERNAL MEDICINE

## 2018-01-12 PROCEDURE — 85027 COMPLETE CBC AUTOMATED: CPT | Performed by: INTERNAL MEDICINE

## 2018-01-12 PROCEDURE — 99232 SBSQ HOSP IP/OBS MODERATE 35: CPT | Performed by: INTERNAL MEDICINE

## 2018-01-12 PROCEDURE — 27210995 ZZH RX 272: Performed by: INTERNAL MEDICINE

## 2018-01-12 PROCEDURE — 25000132 ZZH RX MED GY IP 250 OP 250 PS 637: Performed by: INTERNAL MEDICINE

## 2018-01-12 PROCEDURE — 25000132 ZZH RX MED GY IP 250 OP 250 PS 637: Mod: GY | Performed by: INTERNAL MEDICINE

## 2018-01-12 PROCEDURE — G0378 HOSPITAL OBSERVATION PER HR: HCPCS

## 2018-01-12 PROCEDURE — 73502 X-RAY EXAM HIP UNI 2-3 VIEWS: CPT

## 2018-01-12 RX ORDER — CITALOPRAM HYDROBROMIDE 20 MG/1
20 TABLET ORAL DAILY
Status: DISCONTINUED | OUTPATIENT
Start: 2018-01-12 | End: 2018-01-12

## 2018-01-12 RX ORDER — ACETAMINOPHEN 500 MG
1000 TABLET ORAL 3 TIMES DAILY
Status: DISCONTINUED | OUTPATIENT
Start: 2018-01-12 | End: 2018-01-15 | Stop reason: HOSPADM

## 2018-01-12 RX ORDER — TAMSULOSIN HYDROCHLORIDE 0.4 MG/1
0.4 CAPSULE ORAL EVERY MORNING
Status: DISCONTINUED | OUTPATIENT
Start: 2018-01-12 | End: 2018-01-12

## 2018-01-12 RX ADMIN — SODIUM CHLORIDE: 9 INJECTION, SOLUTION INTRAVENOUS at 18:37

## 2018-01-12 RX ADMIN — ACETAMINOPHEN 1000 MG: 500 TABLET, FILM COATED ORAL at 21:48

## 2018-01-12 RX ADMIN — SODIUM CHLORIDE: 9 INJECTION, SOLUTION INTRAVENOUS at 06:04

## 2018-01-12 RX ADMIN — TAMSULOSIN HYDROCHLORIDE 0.4 MG: 0.4 CAPSULE ORAL at 09:52

## 2018-01-12 RX ADMIN — ACETAMINOPHEN 1000 MG: 500 TABLET, FILM COATED ORAL at 15:28

## 2018-01-12 RX ADMIN — CEFTRIAXONE 1 G: 10 INJECTION, POWDER, FOR SOLUTION INTRAVENOUS at 02:26

## 2018-01-12 RX ADMIN — SENNOSIDES AND DOCUSATE SODIUM 1 TABLET: 8.6; 5 TABLET ORAL at 15:31

## 2018-01-12 RX ADMIN — CITALOPRAM HYDROBROMIDE 20 MG: 20 TABLET ORAL at 09:52

## 2018-01-12 NOTE — PROGRESS NOTES
[facility]    Hospitalist Progress Note    Date of Service (when I saw the patient): 01/12/2018    Assessment & Plan   Kaz Perkins is a 82 year old male who was admitted on 1/11/2018 with a UTI.    1. UTI  - Continue Rocephin  - Gentle IV hydration    2. Urinary retention  - Smith placed at outside ED  - Continue Flomax    3.  Metabolic encephalopathy  - Underlying dementia  - Head CT witj  - Likely due to UTI  - Should resolve with treatment of UTI  - Redirection as needed  - Fall precautions    4. Depression  - Continue Celexa    DVT Prophylaxis: Pneumatic Compression Devices  Code Status: Special Code    Disposition: Expected discharge in 1-3 days.    Prasanth Zavala  Text Page (7 am to 6 pm)    Interval History   The patient is resting in bed.  He is pleasantly confused.  He denies pain.    -Data reviewed today: I reviewed all new labs and imaging results over the last 24 hours. I personally reviewed no images or EKG's today.    Physical Exam   Temp: 98.2  F (36.8  C) Temp src: Oral BP: 140/83 Pulse: 54   Resp: 18 SpO2: 95 % O2 Device: None (Room air)    Vitals:    01/11/18 0527   Weight: 66.4 kg (146 lb 4.8 oz)     Vital Signs with Ranges  Temp:  [98.2  F (36.8  C)-99  F (37.2  C)] 98.2  F (36.8  C)  Pulse:  [54-67] 54  Resp:  [18] 18  BP: (140-152)/(70-83) 140/83  SpO2:  [94 %-96 %] 95 %  I/O last 3 completed shifts:  In: 2099 [P.O.:180; I.V.:1919]  Out: 2150 [Urine:2150]    Gen: Well nourished, elderly male, alert and oriented x 1, no acute distressed  HEENT: Atraumatic, normocephalic  Lungs: Clear to ausculation without wheezes, rhonchi, or rales  Heart: Regular rate and rhythm, no gallops or rubs, 2/6 PHILLIP  GI: Bowel sound normal, no hepatosplenomegaly or masses  Lymph: No lymphadenopathy or edema  Skin: No rashes     Medications     NaCl 75 mL/hr at 01/12/18 0604       acetaminophen (TYLENOL) tablet 1,000 mg  1,000 mg Oral TID     cefTRIAXone  1 g Intravenous Q24H      tamsulosin  0.4 mg Oral Daily     citalopram  20 mg Oral Daily       Data     Recent Labs  Lab 01/12/18  0800   WBC 7.8   HGB 12.4*   MCV 94          No results found for this or any previous visit (from the past 24 hour(s)).

## 2018-01-12 NOTE — PLAN OF CARE
Problem: Patient Care Overview  Goal: Plan of Care/Patient Progress Review  A&O to self, restless and impulsive.  Pulled out multiple Ivs and pulling at alicia catheter, mits applied.  Denies pain.  VSS, on RA.  Alicia patent.  Up w/ asst 1.  IVF infusing.  IV Rocephin. Per dtr Vicki, ok to call anytime.  D/C pending, nursing will continue to monitor.

## 2018-01-12 NOTE — PLAN OF CARE
Problem: Patient Care Overview  Goal: Plan of Care/Patient Progress Review  PT: Orders received, chart reviewed.  Pt at xray at time of attempt.  Pt's daughter Vicki in room and provided subjective information.  Pt resides in [facility] (has baseline dementia, but not in memory care); Vicki reports that in his apartment, pt can usually manage without AD, uses [facility] occasionally in apartment and always outside of apartment.  Family sets up medications in locked drawer and facility makes sure pt takes medications at correct time.  Family does laundry and provides transportation.  Facility provides meals, cleaning services, and sets up showers but pt is able to dress/undress and use toilet IND.  Vicki reports x2 falls in last 6 months.

## 2018-01-12 NOTE — PLAN OF CARE
Problem: Patient Care Overview  Goal: Plan of Care/Patient Progress Review  Outcome: No Change  Patient is very confused; impulsive; pulling at IV and alicia; needs frequent reorientation.  Alicia is patent with adequate UO.  VSS.  Patient had no c/o pain/N/V.  Appetite is good.  Up with assist of 1/walker/GB.  Daughter Vicki is very involved with cares.

## 2018-01-12 NOTE — PLAN OF CARE
Problem: Patient Care Overview  Goal: Plan of Care/Patient Progress Review  Outcome: No Change  3511-9119: Up w/ asst 1, shaky. A&O to self, restless and impulsive at the early am, then calm/cooperative and redirectable. Up in the chair for breakfast and lunch. alicia catheter patent with good UO. Good PO. Denies pain.VSS, on RA. IVF infusing. IV Rocephin. C/o RLE shooting pain. Pelvic xray completed. Per dtr Vicki, ok to call anytime. D/C pending, nursing will continue to monitor.

## 2018-01-13 ENCOUNTER — APPOINTMENT (OUTPATIENT)
Dept: PHYSICAL THERAPY | Facility: CLINIC | Age: 83
DRG: 698 | End: 2018-01-13
Attending: INTERNAL MEDICINE
Payer: MEDICARE

## 2018-01-13 LAB
ANION GAP SERPL CALCULATED.3IONS-SCNC: 5 MMOL/L (ref 3–14)
BUN SERPL-MCNC: 16 MG/DL (ref 7–30)
CALCIUM SERPL-MCNC: 8.5 MG/DL (ref 8.5–10.1)
CHLORIDE SERPL-SCNC: 105 MMOL/L (ref 94–109)
CO2 SERPL-SCNC: 30 MMOL/L (ref 20–32)
CREAT SERPL-MCNC: 1.06 MG/DL (ref 0.66–1.25)
ERYTHROCYTE [DISTWIDTH] IN BLOOD BY AUTOMATED COUNT: 14 % (ref 10–15)
GFR SERPL CREATININE-BSD FRML MDRD: 67 ML/MIN/1.7M2
GLUCOSE SERPL-MCNC: 90 MG/DL (ref 70–99)
HCT VFR BLD AUTO: 39.8 % (ref 40–53)
HGB BLD-MCNC: 13.4 G/DL (ref 13.3–17.7)
MCH RBC QN AUTO: 31.6 PG (ref 26.5–33)
MCHC RBC AUTO-ENTMCNC: 33.7 G/DL (ref 31.5–36.5)
MCV RBC AUTO: 94 FL (ref 78–100)
PLATELET # BLD AUTO: 201 10E9/L (ref 150–450)
POTASSIUM SERPL-SCNC: 4.2 MMOL/L (ref 3.4–5.3)
RBC # BLD AUTO: 4.24 10E12/L (ref 4.4–5.9)
SODIUM SERPL-SCNC: 140 MMOL/L (ref 133–144)
WBC # BLD AUTO: 6.3 10E9/L (ref 4–11)

## 2018-01-13 PROCEDURE — 12000000 ZZH R&B MED SURG/OB

## 2018-01-13 PROCEDURE — 97110 THERAPEUTIC EXERCISES: CPT | Mod: GP | Performed by: PHYSICAL THERAPIST

## 2018-01-13 PROCEDURE — 80048 BASIC METABOLIC PNL TOTAL CA: CPT | Performed by: INTERNAL MEDICINE

## 2018-01-13 PROCEDURE — A9270 NON-COVERED ITEM OR SERVICE: HCPCS | Mod: GY | Performed by: INTERNAL MEDICINE

## 2018-01-13 PROCEDURE — 97116 GAIT TRAINING THERAPY: CPT | Mod: GP | Performed by: PHYSICAL THERAPIST

## 2018-01-13 PROCEDURE — 40000193 ZZH STATISTIC PT WARD VISIT: Performed by: PHYSICAL THERAPIST

## 2018-01-13 PROCEDURE — 25000128 H RX IP 250 OP 636: Performed by: INTERNAL MEDICINE

## 2018-01-13 PROCEDURE — 27210995 ZZH RX 272: Performed by: INTERNAL MEDICINE

## 2018-01-13 PROCEDURE — 25000132 ZZH RX MED GY IP 250 OP 250 PS 637: Mod: GY | Performed by: INTERNAL MEDICINE

## 2018-01-13 PROCEDURE — 36415 COLL VENOUS BLD VENIPUNCTURE: CPT | Performed by: INTERNAL MEDICINE

## 2018-01-13 PROCEDURE — 85027 COMPLETE CBC AUTOMATED: CPT | Performed by: INTERNAL MEDICINE

## 2018-01-13 PROCEDURE — 99232 SBSQ HOSP IP/OBS MODERATE 35: CPT | Performed by: INTERNAL MEDICINE

## 2018-01-13 PROCEDURE — 97161 PT EVAL LOW COMPLEX 20 MIN: CPT | Mod: GP | Performed by: PHYSICAL THERAPIST

## 2018-01-13 RX ADMIN — TAMSULOSIN HYDROCHLORIDE 0.4 MG: 0.4 CAPSULE ORAL at 09:21

## 2018-01-13 RX ADMIN — CEFTRIAXONE 1 G: 10 INJECTION, POWDER, FOR SOLUTION INTRAVENOUS at 03:27

## 2018-01-13 RX ADMIN — CITALOPRAM HYDROBROMIDE 20 MG: 20 TABLET ORAL at 09:21

## 2018-01-13 RX ADMIN — ACETAMINOPHEN 1000 MG: 500 TABLET, FILM COATED ORAL at 09:21

## 2018-01-13 RX ADMIN — ACETAMINOPHEN 1000 MG: 500 TABLET, FILM COATED ORAL at 15:53

## 2018-01-13 RX ADMIN — ACETAMINOPHEN 1000 MG: 500 TABLET, FILM COATED ORAL at 21:38

## 2018-01-13 RX ADMIN — SODIUM CHLORIDE: 9 INJECTION, SOLUTION INTRAVENOUS at 21:39

## 2018-01-13 RX ADMIN — SODIUM CHLORIDE: 9 INJECTION, SOLUTION INTRAVENOUS at 09:21

## 2018-01-13 RX ADMIN — SENNOSIDES AND DOCUSATE SODIUM 1 TABLET: 8.6; 5 TABLET ORAL at 09:21

## 2018-01-13 NOTE — PLAN OF CARE
Problem: Patient Care Overview  Goal: Plan of Care/Patient Progress Review  PT: Pt orders received, evaluation completed, treatment initiated. Pt is an 83yo male admitted due to acute metabolic encephalopathy secondary to UTI. PMH significant for dementia and previous falls. Pt report of living situation unreliable, report from daughter stated that he lives in an skilled nursing, is IND with ADLs, IND within apt without AD, uses FWW for distances, receives assistance from [facility] for meals, cleaning services, and sets up showers but pt is able to dress/undress and use toilet IND, family provides transportation and does laundry for pt.     Discharge Planner PT   Patient plan for discharge: Return to skilled nursing  Current status: Pt AxO x1 to person only, difficulty recalling during subjective questioning and in regards to activities completed today. Pt confused throughout session. Pt performed supine <>sit with CGAx1 and required minAx1 with sit>stand, CGA stand>sit. Pt had reported dizziness upon sitting at EOB, denies with standing. Pt ambulated within room 10'x2 with FWW and Ben x1 plus SBA of 1 for lines/equipment, no overt LOB but appears shaky (limited amb distance d/t pt's neighbor in Bed 1 being transferred to cart and taken out of room and unable to safely pass by to get to hallway). Pt completed BLE supine exercises, demonstrates ataxic movement patterns LLE>RLE. Pt reported no LE pain throughout session with both active and passive assessment, no LE pain reported with weightbearing activities this session.  Discussed with RN.  Barriers to return to prior living situation: Current level of assist, increased confusion, decreased activity tolerance, weakness  Recommendations for discharge: TCU  Rationale for recommendations: Pt appears below baseline with mobility and balance, appears he would benefit from TCU for daily therapies to return to PLOF. If pt/family chooses to return to skilled nursing, pt would need Ax1 and walker for all mobility  and HHPT to progress independence with mobility.       Entered by: Chyna Barrett 01/13/2018 11:34 AM

## 2018-01-13 NOTE — PLAN OF CARE
Problem: Patient Care Overview  Goal: Plan of Care/Patient Progress Review  Outcome: No Change  Pt alert, oriented to self only. H/o dementia at baseline. Redirectable, calm/cooperative with cares this shift. VSS on RA. Afebrile. Denies pain. L/s clear. +BS. No BM this shift. Smith cath patent/intact, adequate urine OP. PIV infusing NS 75cc/hr. On iv Rocephin. Tolerating reg diet. Up assist x1. Pt's dtr Vicki called, updated. Vicki is working today, can be reached only via text message on phone. Requested to call sonRemigio for any urgent issues. Remigio's contact # on chart. D/c pending progress. Nursing continue to monitor.

## 2018-01-13 NOTE — PROGRESS NOTES
01/13/18 1058   Quick Adds   Type of Visit Initial PT Evaluation   Living Environment   Lives With alone   Living Arrangements assisted living   Home Accessibility no concerns   Transportation Available family or friend will provide   Self-Care   Dominant Hand right   Usual Activity Tolerance good   Current Activity Tolerance fair   Regular Exercise no   Equipment Currently Used at Home walker, rolling   Functional Level Prior   Ambulation 1-->assistive equipment   Transferring 0-->independent   Toileting 0-->independent   Bathing 2-->assistive person   Dressing 0-->independent   Eating 0-->independent   Communication 0-->understands/communicates without difficulty   Swallowing 0-->swallows foods/liquids without difficulty   Cognition 1 - attention or memory deficits   Fall history within last six months yes   Number of times patient has fallen within last six months 2   Which of the above functional risks had a recent onset or change? ambulation;transferring;toileting;bathing;dressing   Prior Functional Level Comment Per daughter report, pt would sometimes use FWW within apartment and other times would ambulate without AD. assisted provides meals, cleaning services, and sets up showers but pt is able to dress/undress and use toilet IND. Family provides transportation and does laundry. Pt always uses FWW in halls/for distance.   General Information   Onset of Illness/Injury or Date of Surgery - Date 01/11/18   Referring Physician Prasanth Zavala MD   Patient/Family Goals Statement Return to [facility]   Pertinent History of Current Problem (include personal factors and/or comorbidities that impact the POC) Pt is an 81 yo male admitted due to acute metabolic encephalopathy secondary to urinary tract infection. PMH significant for dementia and past falls.   Precautions/Limitations fall precautions   Weight-Bearing Status - LUE full weight-bearing   Weight-Bearing Status - RUE full weight-bearing   Weight-Bearing Status -  LLE full weight-bearing   Weight-Bearing Status - RLE weight-bearing as tolerated  (per ortho consult)   General Observations Pt supine in bed upon PT arrival. Pt confused and unreliable historian regarding living environment and prior activities he had done today.   General Info Comments Activity: Up with Assist   Cognitive Status Examination   Orientation person   Level of Consciousness confused   Follows Commands and Answers Questions 75% of the time;able to follow single-step instructions   Personal Safety and Judgment at risk behaviors demonstrated   Memory impaired  (Could not recall the MD visit about 15 minutes prior to PT)   Pain Assessment   Patient Currently in Pain No   Integumentary/Edema   Integumentary/Edema Comments Upon arrival, PT noted small skin tear on LLE just below lateral knee   Posture    Posture Forward head position;Protracted shoulders;Kyphosis   Range of Motion (ROM)   ROM Comment BLE grossly WFL with no pain reported   Strength   Strength Comments Able to move BLE against gravity, and able to complete bridge in supine   Bed Mobility   Bed Mobility Comments Pt completes supine <>sit with CGA. Pt reported dizziness upon sitting. BP stable and pt able to continue with appt.   Transfer Skills   Transfer Comments Pt completes sit to stand from bed with minAx1 to FWW, stand to sit to bed CGA with FWW.   Gait   Gait Comments Pt ambulated 10' within room with FWW and minAx2. Pt demonstrates slightly antalgic gait and requires assistance with FWW management within room setting.    Balance   Balance Comments Pt demonstrates forward sway with righting reactions while standing within FWW but not holding on. Could maintain with eyes open for 30 seconds with no UE support.     Sensory Examination   Sensory Perception Comments Pt denied any changes in sensation in BLE.   Coordination   Coordination Comments Pt demonstrated ataxic movements of LLE during shin slide, normal RLE. Pt could complete each  "finger to thumb Bilaterally WNL   General Therapy Interventions   Planned Therapy Interventions balance training;bed mobility training;gait training;neuromuscular re-education;ROM;strengthening;stretching;transfer training;home program guidelines;progressive activity/exercise   Clinical Impression   Criteria for Skilled Therapeutic Intervention yes, treatment indicated   PT Diagnosis Impaired gait   Influenced by the following impairments BLE weakness, decreased coordination, confusion   Functional limitations due to impairments Decreased activity tolerance, need for increased assistance during ambulation   Clinical Presentation Evolving/Changing   Clinical Presentation Rationale Shakiness, increased confusion   Clinical Decision Making (Complexity) Low complexity   Therapy Frequency` daily   Predicted Duration of Therapy Intervention (days/wks) 4 days   Anticipated Discharge Disposition Transitional Care Facility;Long Term Care Facility   Risk & Benefits of therapy have been explained Yes   Patient, Family & other staff in agreement with plan of care Yes   [facility] TM \"6 Clicks\"   2016, Trustees of [facility], under license to [email or web address].  All rights reserved.   6 Clicks Short Forms Basic Mobility Inpatient Short Form   [facility]  \"6 Clicks\" V.2 Basic Mobility Inpatient Short Form   1. Turning from your back to your side while in a flat bed without using bedrails? 3 - A Little   2. Moving from lying on your back to sitting on the side of a flat bed without using bedrails? 3 - A Little   3. Moving to and from a bed to a chair (including a wheelchair)? 3 - A Little   4. Standing up from a chair using your arms (e.g., wheelchair, or bedside chair)? 3 - A Little   5. To walk in hospital room? 3 - A Little   6. Climbing 3-5 steps with a railing? 2 - A Lot   Basic Mobility Raw Score (Score out of 24.Lower scores equate to lower levels of function) 17   Total Evaluation Time   Total " Evaluation Time (Minutes) 13

## 2018-01-13 NOTE — PROGRESS NOTES
[facility]    Hospitalist Progress Note    Date of Service (when I saw the patient): 01/13/2018    Assessment & Plan   Kaz Perkins is a 82 year old male who was admitted on 1/11/2018 with a UTI.    1. UTI  - Continue Rocephin  - Gentle IV hydration    2. Urinary retention  - Smith placed at outside ED  - Continue Flomax    3.  Metabolic encephalopathy  - Underlying dementia  - Head CT witjh  - Likely due to UTI  - Should resolve with treatment of UTI  - Redirection as needed  - Fall precautions    4. Depression  - Continue Celexa    5. R leg/hip pain  - X-ray demonstrates healing ORIF R femur w/o obvious new fracture  - Will obtain X-rays from [facility] for comparison  - Consider Ortho Consult  - Ambulate with assist qid    DVT Prophylaxis: Pneumatic Compression Devices  Code Status: Special Code    Disposition: Expected discharge in 1-3 days.    Prasanth Zavala  Text Page (7 am to 6 pm)    Interval History   The patient is sitting in a chair.  He is pleasantly confused.  He denies pain.    -Data reviewed today: I reviewed all new labs and imaging results over the last 24 hours. I personally reviewed no images or EKG's today.    Physical Exam   Temp: 97.6  F (36.4  C) Temp src: Oral BP: 158/77 Pulse: 52   Resp: 16 SpO2: 96 % O2 Device: None (Room air)    Vitals:    01/11/18 0527   Weight: 66.4 kg (146 lb 4.8 oz)     Vital Signs with Ranges  Temp:  [97.6  F (36.4  C)-97.8  F (36.6  C)] 97.6  F (36.4  C)  Pulse:  [52-61] 52  Resp:  [16] 16  BP: (146-158)/(73-79) 158/77  SpO2:  [94 %-96 %] 96 %  I/O last 3 completed shifts:  In: 2020 [P.O.:240; I.V.:1780]  Out: 2900 [Urine:2900]    Gen: Well nourished, elderly male, alert and oriented x 1, no acute distressed  HEENT: Atraumatic, normocephalic  Lungs: Clear to ausculation without wheezes, rhonchi, or rales  Heart: Regular rate and rhythm, no gallops or rubs, 2/6 PHILLIP  GI: Bowel sound normal, no hepatosplenomegaly or masses  Lymph: No  lymphadenopathy or edema  Skin: No rashes     Medications     NaCl 75 mL/hr at 01/13/18 0921       acetaminophen (TYLENOL) tablet 1,000 mg  1,000 mg Oral TID     cefTRIAXone  1 g Intravenous Q24H     tamsulosin  0.4 mg Oral Daily     citalopram  20 mg Oral Daily       Data     Recent Labs  Lab 01/13/18  0807 01/12/18  0800   WBC 6.3 7.8   HGB 13.4 12.4*   MCV 94 94    221     --    POTASSIUM 4.2  --    CHLORIDE 105  --    CO2 30  --    BUN 16  --    CR 1.06  --    ANIONGAP 5  --    EBONIE 8.5  --    GLC 90  --        Recent Results (from the past 24 hour(s))   XR Pelvis w Hip Right 1 View    Narrative    PELVIS WITH UNILATERAL HIP ONE VIEW RIGHT  1/12/2018 4:41 PM     HISTORY: r hip pain;     COMPARISON: None.    FINDINGS: The patient is status post internal fixation of a right  subtrochanteric femur fracture. The intramedullary jeanne is in good  position. There is good alignment of the fracture fragments. Callus is  seen at the fracture site. A small amount of heterotopic bone is seen  cephalad to the greater trochanter. Catheter is present in the  bladder. Mild degenerative change in the hip joints..      Impression    IMPRESSION:   1. Patient is status post internal fixation of a right subtrochanteric  femur fracture. Callus is seen at the fracture site.  2. No acute fractures are seen.  3. Degenerative change in the hip and knee joints.    AMBER SPAIN MD

## 2018-01-13 NOTE — PLAN OF CARE
Problem: Patient Care Overview  Goal: Plan of Care/Patient Progress Review  Outcome: Improving  700-1900: Up w/ asst 1, shaky and unbalanced. A&O to self, calm/cooperative and redirectable. Up in the chair for breakfast and lunch. alicia catheter patent with good UO. Good PO. Denies pain.VSS, on RA. IVF infusing. IV Rocephin. C/o RLE shooting pain, on scheduled tylenol. Ortho consulted, no new recommendations. Daughter Vicki updated. D/C pending, nursing will continue to monitor.

## 2018-01-13 NOTE — PLAN OF CARE
Problem: Patient Care Overview  Goal: Plan of Care/Patient Progress Review  Outcome: No Change  Alert to self, demenita. Impulsive but redirectable. VSS on RA. Up with 1 assist. Up in chair this evening. Smith in place for retention. Good output. Denies pain. IV abx. IVF infusing. Will monitor.

## 2018-01-13 NOTE — CONSULTS
[facility] Orthopedic Consultation    Kaz Perkins MRN# [id number]   Age: 82 year old YOB: 1935     Date of Admission:  1/11/2018    Reason for consult: H/o R hip sx s/p cephalomedullary nail       Requesting physician: Dr. Zavala       Level of consult: One-time consult to assist in determining a diagnosis and to recommend an appropriate treatment plan           Assessment and Plan:   Assessment:   R hip pain      Plan:   - WBAT RLE  - PT/OT for mobilization  - Hip fracture appears to be healing appropriately  - no concern for other R hip pathology  - f/u with operative surgeon at [facility] after discharge           Chief Complaint:   R hip pain         History of Present Illness:   This patient is a 82 year old male who presents with the following condition requiring a hospital admission:    81 yo M with dementia admitted 2/2 confusion and dizziness. Patient has a history of R hip fracture that was treated with cephalomedullary nailing in [facility] in September. Overall patient seems confused and is unable to give a good history. When asked, he did not complain of significant hip pain, except a bump over her R hip. He states he is able to ambulate. He also states he is not sure why he is here. Overall seems very confused.          Past Medical History:   No past medical history on file.          Past Surgical History:   No past surgical history on file.          Social History:     Social History   Substance Use Topics     Smoking status: Not on file     Smokeless tobacco: Not on file     Alcohol use Not on file             Family History:   No family history on file.          Immunizations:     VACCINE/DOSE   Diptheria   DPT   DTAP   HBIG   Hepatitis A   Hepatitis B   HIB   Influenza   Measles   Meningococcal   MMR   Mumps   Pneumococcal   Polio   Rubella   Small Pox   TDAP   Varicella   Zoster             Allergies:     Allergies   Allergen Reactions     Oxycodone Other  (See Comments)     HALLUCINATIONS             Medications:     Current Facility-Administered Medications   Medication     acetaminophen (TYLENOL) tablet 1,000 mg     naloxone (NARCAN) injection 0.1-0.4 mg     0.9% sodium chloride infusion     senna-docusate (SENOKOT-S;PERICOLACE) 8.6-50 MG per tablet 1 tablet    Or     senna-docusate (SENOKOT-S;PERICOLACE) 8.6-50 MG per tablet 2 tablet     magnesium hydroxide (MILK OF MAGNESIA) suspension 30 mL     bisacodyl (DULCOLAX) Suppository 10 mg     cefTRIAXone (ROCEPHIN) 1 g in 10 mL SWFI Premix Syringe     tamsulosin (FLOMAX) capsule 0.4 mg     citalopram (celeXA) tablet 20 mg             Review of Systems:   Negative except as per hpi          Physical Exam:   All vitals have been reviewed  Patient Vitals for the past 24 hrs:   BP Temp Temp src Pulse Resp SpO2   01/13/18 0011 158/77 97.6  F (36.4  C) Oral 52 16 96 %   01/12/18 1945 148/73 97.7  F (36.5  C) Oral 61 16 94 %   01/12/18 1558 146/79 97.8  F (36.6  C) Oral 58 16 94 %       Intake/Output Summary (Last 24 hours) at 01/13/18 1234  Last data filed at 01/13/18 1007   Gross per 24 hour   Intake             1900 ml   Output             1900 ml   Net                0 ml     RLE:  R hip incisions c/d/i  Flexion 90, IR 10, ER 30 without significant pain  +mild bony protuberance over the lateral hip, due to new bone formation evident on xray.  +DF/PF/EHL  sensation intact throughout RLE  Unable to test gait 2/2 patient mental status          Data:   All laboratory data reviewed  Results for orders placed or performed during the hospital encounter of 01/11/18   XR Pelvis w Hip Right 1 View    Narrative    PELVIS WITH UNILATERAL HIP ONE VIEW RIGHT  1/12/2018 4:41 PM     HISTORY: r hip pain;     COMPARISON: None.    FINDINGS: The patient is status post internal fixation of a right  subtrochanteric femur fracture. The intramedullary jeanne is in good  position. There is good alignment of the fracture fragments. Callus is  seen  at the fracture site. A small amount of heterotopic bone is seen  cephalad to the greater trochanter. Catheter is present in the  bladder. Mild degenerative change in the hip joints..      Impression    IMPRESSION:   1. Patient is status post internal fixation of a right subtrochanteric  femur fracture. Callus is seen at the fracture site.  2. No acute fractures are seen.  3. Degenerative change in the hip and knee joints.    AMBER SPAIN MD   CBC with platelets   Result Value Ref Range    WBC 7.8 4.0 - 11.0 10e9/L    RBC Count 3.95 (L) 4.4 - 5.9 10e12/L    Hemoglobin 12.4 (L) 13.3 - 17.7 g/dL    Hematocrit 37.2 (L) 40.0 - 53.0 %    MCV 94 78 - 100 fl    MCH 31.4 26.5 - 33.0 pg    MCHC 33.3 31.5 - 36.5 g/dL    RDW 14.3 10.0 - 15.0 %    Platelet Count 221 150 - 450 10e9/L   Basic metabolic panel   Result Value Ref Range    Sodium 140 133 - 144 mmol/L    Potassium 4.2 3.4 - 5.3 mmol/L    Chloride 105 94 - 109 mmol/L    Carbon Dioxide 30 20 - 32 mmol/L    Anion Gap 5 3 - 14 mmol/L    Glucose 90 70 - 99 mg/dL    Urea Nitrogen 16 7 - 30 mg/dL    Creatinine 1.06 0.66 - 1.25 mg/dL    GFR Estimate 67 >60 mL/min/1.7m2    GFR Estimate If Black 81 >60 mL/min/1.7m2    Calcium 8.5 8.5 - 10.1 mg/dL   CBC with platelets   Result Value Ref Range    WBC 6.3 4.0 - 11.0 10e9/L    RBC Count 4.24 (L) 4.4 - 5.9 10e12/L    Hemoglobin 13.4 13.3 - 17.7 g/dL    Hematocrit 39.8 (L) 40.0 - 53.0 %    MCV 94 78 - 100 fl    MCH 31.6 26.5 - 33.0 pg    MCHC 33.7 31.5 - 36.5 g/dL    RDW 14.0 10.0 - 15.0 %    Platelet Count 201 150 - 450 10e9/L            Edil Grant MD

## 2018-01-14 ENCOUNTER — APPOINTMENT (OUTPATIENT)
Dept: PHYSICAL THERAPY | Facility: CLINIC | Age: 83
DRG: 698 | End: 2018-01-14
Attending: INTERNAL MEDICINE
Payer: MEDICARE

## 2018-01-14 PROCEDURE — 12000000 ZZH R&B MED SURG/OB

## 2018-01-14 PROCEDURE — 25000132 ZZH RX MED GY IP 250 OP 250 PS 637: Mod: GY | Performed by: INTERNAL MEDICINE

## 2018-01-14 PROCEDURE — 25000128 H RX IP 250 OP 636: Performed by: INTERNAL MEDICINE

## 2018-01-14 PROCEDURE — 97116 GAIT TRAINING THERAPY: CPT | Mod: GP | Performed by: PHYSICAL THERAPIST

## 2018-01-14 PROCEDURE — 97110 THERAPEUTIC EXERCISES: CPT | Mod: GP | Performed by: PHYSICAL THERAPIST

## 2018-01-14 PROCEDURE — 40000193 ZZH STATISTIC PT WARD VISIT: Performed by: PHYSICAL THERAPIST

## 2018-01-14 PROCEDURE — A9270 NON-COVERED ITEM OR SERVICE: HCPCS | Mod: GY | Performed by: INTERNAL MEDICINE

## 2018-01-14 PROCEDURE — 27210995 ZZH RX 272: Performed by: INTERNAL MEDICINE

## 2018-01-14 PROCEDURE — 99232 SBSQ HOSP IP/OBS MODERATE 35: CPT | Performed by: INTERNAL MEDICINE

## 2018-01-14 RX ADMIN — ACETAMINOPHEN 1000 MG: 500 TABLET, FILM COATED ORAL at 08:47

## 2018-01-14 RX ADMIN — CEFTRIAXONE 1 G: 10 INJECTION, POWDER, FOR SOLUTION INTRAVENOUS at 04:25

## 2018-01-14 RX ADMIN — SODIUM CHLORIDE: 9 INJECTION, SOLUTION INTRAVENOUS at 10:51

## 2018-01-14 RX ADMIN — TAMSULOSIN HYDROCHLORIDE 0.4 MG: 0.4 CAPSULE ORAL at 08:47

## 2018-01-14 RX ADMIN — ACETAMINOPHEN 1000 MG: 500 TABLET, FILM COATED ORAL at 22:00

## 2018-01-14 RX ADMIN — ACETAMINOPHEN 1000 MG: 500 TABLET, FILM COATED ORAL at 15:58

## 2018-01-14 RX ADMIN — CITALOPRAM HYDROBROMIDE 20 MG: 20 TABLET ORAL at 08:47

## 2018-01-14 NOTE — PLAN OF CARE
Problem: Patient Care Overview  Goal: Plan of Care/Patient Progress Review  Outcome: Improving  Pt alert, oriented to self only. VSS on RA. Denies pain. Smith in place, patent, good UOP. IVF dc'd. Up A1 with walker and NERI. Worked with therapy. Dc pending. Continue to monitor.

## 2018-01-14 NOTE — PLAN OF CARE
Problem: Patient Care Overview  Goal: Plan of Care/Patient Progress Review  Outcome: No Change  Pt A/Ox1, oriented to self. Assist x1, fall risk. VSS on RA. No complaints of pain. IVF infusing. Smith patent with adequate output. D/C pending progress.

## 2018-01-14 NOTE — PROGRESS NOTES
[facility]    Hospitalist Progress Note    Date of Service (when I saw the patient): 01/14/2018    Assessment & Plan   Kaz Perkins is a 82 year old male who was admitted on 1/11/2018 with a UTI.    1. UTI  - Continue Rocephin, started 1/11  - Eating well, saline lock    2. Urinary retention  - Smith placed at outside ED  - Continue Flomax  - Voiding trial at TCU    3.  Metabolic encephalopathy  - Resolved with treatment of UTI  - Underlying dementia  - Head CT without acute changes  - Redirection as needed  - Fall precautions    4. Depression  - Continue Celexa    5. R leg/hip pain  - X-ray demonstrates healing ORIF R femur w/o obvious new fracture  - Ortho Consult appreciated, no evidence for fracture  - Ambulate with assist qid  - PT at TCU    DVT Prophylaxis: Pneumatic Compression Devices  Code Status: Special Code    Disposition: Expected discharge in 1-2 days.    Prasanth Zavala  Text Page (7 am to 6 pm)    Interval History   The patient is resting in bed.  He is pleasantly confused.  He denies pain.    -Data reviewed today: I reviewed all new labs and imaging results over the last 24 hours. I personally reviewed no images or EKG's today.    Physical Exam   Temp: 97.4  F (36.3  C) Temp src: Oral BP: 158/85 Pulse: 58   Resp: 16 SpO2: 92 % O2 Device: None (Room air)    Vitals:    01/11/18 0527   Weight: 66.4 kg (146 lb 4.8 oz)     Vital Signs with Ranges  Temp:  [97.4  F (36.3  C)-98.8  F (37.1  C)] 97.4  F (36.3  C)  Pulse:  [57-60] 58  Resp:  [16] 16  BP: (128-158)/(78-85) 158/85  SpO2:  [92 %-94 %] 92 %  I/O last 3 completed shifts:  In: 120 [P.O.:120]  Out: 1725 [Urine:1725]    Gen: Well nourished, elderly male, alert and oriented x 1, no acute distressed  HEENT: Atraumatic, normocephalic  Lungs: Clear to ausculation without wheezes, rhonchi, or rales  Heart: Regular rate and rhythm, no gallops or rubs, 2/6 PHILLIP  GI: Bowel sound normal, no hepatosplenomegaly or masses  Lymph: No  lymphadenopathy or edema  Skin: No rashes     Medications     NaCl 75 mL/hr at 01/14/18 1051       acetaminophen (TYLENOL) tablet 1,000 mg  1,000 mg Oral TID     cefTRIAXone  1 g Intravenous Q24H     tamsulosin  0.4 mg Oral Daily     citalopram  20 mg Oral Daily       Data     Recent Labs  Lab 01/13/18  0807 01/12/18  0800   WBC 6.3 7.8   HGB 13.4 12.4*   MCV 94 94    221     --    POTASSIUM 4.2  --    CHLORIDE 105  --    CO2 30  --    BUN 16  --    CR 1.06  --    ANIONGAP 5  --    EBONIE 8.5  --    GLC 90  --        No results found for this or any previous visit (from the past 24 hour(s)).

## 2018-01-14 NOTE — PLAN OF CARE
Problem: Patient Care Overview  Goal: Plan of Care/Patient Progress Review  Discharge Planner PT   Patient plan for discharge: Return to [facility]  Current status: Pt voicing confusion throughout session and difficulty with short-term recall. Pt performed supine <>sit with CGAx1 and required minAx1 with sit to stand from bed to FWW, CGA stand to sit to chair. Pt ambulated 200' with FWW Ben x1 and 1 person assist with IV pole, no overt LOB. Pt completed BLE seated exercises and were tolerated well.  Barriers to return to prior living situation: Current level of assist, confusion, decreased activity tolerance, weakness  Recommendations for discharge: TCU  Rationale for recommendations: Pt appears below baseline with mobility and balance, appears he would benefit from TCU for daily therapies to return to PLOF. If pt/family chooses to return to [facility], pt would need Ax1 and walker for all mobility and HHPT to progress independence with mobility.       Entered by: Chyna Barrett 01/14/2018 10:05 AM

## 2018-01-14 NOTE — PROGRESS NOTES
D: SW following for DC planning. SW reviewed chart. PT recommends TCU.  I: Referral sent via DOD to [facility]CU, per discussion with dtr on 1/11 with [facility].   P: HECTOR will follow.     Rukhsana Castro MSW, LGSW  [id number]

## 2018-01-15 VITALS
WEIGHT: 146.3 LBS | SYSTOLIC BLOOD PRESSURE: 129 MMHG | TEMPERATURE: 97.8 F | RESPIRATION RATE: 18 BRPM | OXYGEN SATURATION: 95 % | DIASTOLIC BLOOD PRESSURE: 76 MMHG | HEART RATE: 70 BPM

## 2018-01-15 PROCEDURE — 25000132 ZZH RX MED GY IP 250 OP 250 PS 637: Mod: GY | Performed by: INTERNAL MEDICINE

## 2018-01-15 PROCEDURE — A9270 NON-COVERED ITEM OR SERVICE: HCPCS | Mod: GY | Performed by: INTERNAL MEDICINE

## 2018-01-15 PROCEDURE — 99239 HOSP IP/OBS DSCHRG MGMT >30: CPT | Performed by: HOSPITALIST

## 2018-01-15 PROCEDURE — 27210995 ZZH RX 272: Performed by: INTERNAL MEDICINE

## 2018-01-15 PROCEDURE — 25000128 H RX IP 250 OP 636: Performed by: INTERNAL MEDICINE

## 2018-01-15 RX ORDER — AMOXICILLIN AND CLAVULANATE POTASSIUM 500; 125 MG/1; MG/1
1 TABLET, FILM COATED ORAL 2 TIMES DAILY
Qty: 2 TABLET | Refills: 0
Start: 2018-01-15 | End: 2018-01-16

## 2018-01-15 RX ORDER — AMOXICILLIN 250 MG
1 CAPSULE ORAL 2 TIMES DAILY PRN
Qty: 100 TABLET | Refills: 0
Start: 2018-01-15

## 2018-01-15 RX ADMIN — CITALOPRAM HYDROBROMIDE 20 MG: 20 TABLET ORAL at 08:27

## 2018-01-15 RX ADMIN — ACETAMINOPHEN 1000 MG: 500 TABLET, FILM COATED ORAL at 16:21

## 2018-01-15 RX ADMIN — CEFTRIAXONE 1 G: 10 INJECTION, POWDER, FOR SOLUTION INTRAVENOUS at 06:43

## 2018-01-15 RX ADMIN — TAMSULOSIN HYDROCHLORIDE 0.4 MG: 0.4 CAPSULE ORAL at 08:27

## 2018-01-15 RX ADMIN — ACETAMINOPHEN 1000 MG: 500 TABLET, FILM COATED ORAL at 08:26

## 2018-01-15 NOTE — DISCHARGE SUMMARY
[facility]    Discharge Summary  Hospitalist    Date of Admission:  1/11/2018  Date of Discharge:  1/15/2018  Discharging Provider: Valdez Mclaughlin DO    Discharge Diagnoses   UTI with   Metabolic encephalopathy  Urinary obstruction, acute    History of Present Illness   Kaz Perkins is an 82 year old male who presented with acute urinary retention    Hospital Course   Kaz Perkins was admitted on 1/11/2018.  The following problems were addressed during his hospitalization:    Active Problems:    Encephalopathy    Dementia    Urinary tract infection    Urethral stenosis    Agitation    UTI (urinary tract infection)  1. UTI, but culture only with < 10,000 CFU of mixed burak. Given unclear presentation, timing of antibiotics at outside hospital will complete 5 day course, although I suspect most of his symptoms are related to obstruction rather than infection  - Continue Rocephin, started 1/11, had 4 days, will complete 1 day of augmentin       2. Urinary retention  - Smith placed at outside ED  - Continue Flomax  - fu with his urologist Dr. chance     3.  Metabolic encephalopathy  - Resolved with treatment of UTI  - Underlying dementia  - Head CT without acute changes  - Redirection as needed  - Fall precautions     4. Depression  - Continue Celexa     5. R leg/hip pain  - X-ray demonstrates healing ORIF R femur w/o obvious new fracture  - Ortho Consult appreciated, no evidence for fracture  - Ambulate with assist qid  - PT at TCU    Valdez Mclaughlin DO    Significant Results and Procedures   NA    Pending Results   These results will be followed up by [facility]  Unresulted Labs Ordered in the Past 30 Days of this Admission     No orders found from 11/12/2017 to 1/12/2018.          Code Status   CPR but no intubation per polst       Primary Care Physician   Physician No Ref-Primary    Physical Exam   Temp: 97.7  F (36.5  C) Temp src: Oral BP: 131/77 Pulse: 67   Resp: 18 SpO2: 93 %  O2 Device: None (Room air)    Vitals:    01/11/18 0527   Weight: 66.4 kg (146 lb 4.8 oz)     Vital Signs with Ranges  Temp:  [97.7  F (36.5  C)-98.1  F (36.7  C)] 97.7  F (36.5  C)  Pulse:  [62-67] 67  Resp:  [16-18] 18  BP: (131-145)/(77-84) 131/77  SpO2:  [92 %-94 %] 93 %  I/O last 3 completed shifts:  In: 600 [I.V.:600]  Out: 2100 [Urine:2100]    Constitutional: Awake, alert, cooperative, no apparent distress.  Eyes: Conjunctiva and pupils examined and normal.  Respiratory: Clear to auscultation bilaterally, no crackles or wheezing.  Cardiovascular: Regular rate and rhythm, normal S1 and S2, and no murmur noted.  Skin: No rashes, no cyanosis, no edema.  Musculoskeletal: No joint swelling, erythema or tenderness.  Neurologic: Cranial nerves 2-12 intact, normal strength and sensation.  Psychiatric: Alert, oriented to person only no obvious anxiety or depression.    Discharge Disposition   Discharged to short-term care facility  Condition at discharge: Stable    Consultations This Hospital Stay   PHYSICAL THERAPY ADULT IP CONSULT  ORTHOPEDIC SURGERY IP CONSULT  PHYSICAL THERAPY ADULT IP CONSULT  OCCUPATIONAL THERAPY ADULT IP CONSULT    Time Spent on this Encounter   I, Valdez Mclaughlin, personally saw the patient today and spent greater than 30 minutes discharging this patient.    Discharge Orders     General info for SNF   Length of Stay Estimate: Short Term Care: Estimated # of Days <30  Condition at Discharge: Improving  Level of care:skilled   Rehabilitation Potential: Fair  Admission H&P remains valid and up-to-date: Yes  Recent Chemotherapy: N/A  Use Nursing Home Standing Orders: N/A     Mantoux instructions   Give two-step Mantoux (PPD) Per Facility Policy Yes     Reason for your hospital stay   Urinary obstruction     Smith catheter   To straight gravity drainage. Change catheter every 2 weeks and PRN for leaking or decreased uring output with signs of bladder distention. DO NOT change catheter without a  specific MD order IF diagnosis of benign prostatic hypertrophy (BPH), neurogenic bladder, or other urological conditions     Follow Up and recommended labs and tests   Follow up with Nursing home physician.  No follow up labs or test are needed.    Follow up with urologist, Dr. Michel in 1-2 weeks for urinary obstruction and to work on alicia removal     Activity - Up with nursing assistance     Special Code (specify)   CPR OK. But do not intubate  (per POLST)     Physical Therapy Adult Consult   Evaluate and treat as clinically indicated.    Reason:  Deconditioning, recent hip fracture     Occupational Therapy Adult Consult   Evaluate and treat as clinically indicated.    Reason:  Deconditioning, recent hip fracture       Discharge Medications   Current Discharge Medication List      START taking these medications    Details   senna-docusate (SENOKOT-S;PERICOLACE) 8.6-50 MG per tablet Take 1 tablet by mouth 2 times daily as needed for constipation  Qty: 100 tablet, Refills: 0    Associated Diagnoses: Other constipation         CONTINUE these medications which have NOT CHANGED    Details   Acetaminophen (TYLENOL PO) Take 1,000 mg by mouth 3 times daily Do not exceed 4000 mg po in 24 hours.      CITALOPRAM HYDROBROMIDE PO Take 20 mg by mouth daily      TAMSULOSIN HCL PO Take 0.4 mg by mouth every morning After breakfast      VITAMIN D, CHOLECALCIFEROL, PO Take 1,000 Units by mouth daily           Allergies   Allergies   Allergen Reactions     Oxycodone Other (See Comments)     HALLUCINATIONS     Data   Most Recent 3 CBC's:  Recent Labs   Lab Test  01/13/18   0807  01/12/18   0800   WBC  6.3  7.8   HGB  13.4  12.4*   MCV  94  94   PLT  201  221      Most Recent 3 BMP's:  Recent Labs   Lab Test  01/13/18   0807   NA  140   POTASSIUM  4.2   CHLORIDE  105   CO2  30   BUN  16   CR  1.06   ANIONGAP  5   EBONIE  8.5   GLC  90     Most Recent 2 LFT's:No lab results found.  Most Recent INR's and Anticoagulation Dosing  History:  Anticoagulation Dose History     There is no flowsheet data to display.        Most Recent 3 Troponin's:No lab results found.  Most Recent Cholesterol Panel:No lab results found.  Most Recent 6 Bacteria Isolates From Any Culture (See EPIC Reports for Culture Details):No lab results found.  Most Recent TSH, T4 and A1c Labs:No lab results found.  Results for orders placed or performed during the hospital encounter of 01/11/18   XR Pelvis w Hip Right 1 View    Narrative    PELVIS WITH UNILATERAL HIP ONE VIEW RIGHT  1/12/2018 4:41 PM     HISTORY: r hip pain;     COMPARISON: None.    FINDINGS: The patient is status post internal fixation of a right  subtrochanteric femur fracture. The intramedullary jeanne is in good  position. There is good alignment of the fracture fragments. Callus is  seen at the fracture site. A small amount of heterotopic bone is seen  cephalad to the greater trochanter. Catheter is present in the  bladder. Mild degenerative change in the hip joints..      Impression    IMPRESSION:   1. Patient is status post internal fixation of a right subtrochanteric  femur fracture. Callus is seen at the fracture site.  2. No acute fractures are seen.  3. Degenerative change in the hip and knee joints.    AMBER SPAIN MD

## 2018-01-15 NOTE — PLAN OF CARE
Problem: Patient Care Overview  Goal: Plan of Care/Patient Progress Review  Outcome: Adequate for Discharge Date Met: 01/15/18  Pt is up with assistance of 1 and walker and gait belt. Pt ate well, VSS spoke with daughter and pt was made aware of discharge. She will bring in clothes for discharge.

## 2018-01-15 NOTE — PLAN OF CARE
Problem: Patient Care Overview  Goal: Plan of Care/Patient Progress Review  Outcome: No Change  Pt A/Ox1, oriented to self. Assist x1, fall risk. VSS on RA. No complaints of pain. Smith patent with adequate output. D/C 1-2 days pending progress.

## 2018-01-15 NOTE — PROGRESS NOTES
HECTOR  D) Patient is ready for D/C to TCU today.  I) Followed up with [facility] and was told they have no appropriate beds available today.  Spoke with daughter, Vicki about the other Memory Care TCU; [facility]. Vicki agrees to a referral there, which was sent via the DOD process. Spoke with Cesar in Admissions. Vicki also requests a referral to [facility], which was sent. Rachel in Admissions has already called back to say she does not have an appropriate bed for patient today.  P) Awaiting response from [facility].    Addendum  PAS-RR    Per DHS regulation, HECTOR completed and submitted PAS-RR to [place] Board on Aging Direct Connect via the Senior LinkAge Line.  PAS-RR confirmation # is : [id number]  Further questions may be directed to [facility] LinkAge Line at #[phone], option #4 for PAS-RR staff.  Patient has been accepted at [facility] in a Memory Care TCU bed. There is no charge for the private room there, per Cesar in Admissions.   Orders and the PAS were faxed.  Spoke with daughter Vicki who agrees to this plan. She requests wheelchair transport and was advised patient will be billed for this. Vicki will be in this afternoon to update patient on the plan herself.

## 2018-01-16 NOTE — PLAN OF CARE
Problem: Patient Care Overview  Goal: Plan of Care/Patient Progress Review  Physical Therapy Discharge Summary    Reason for therapy discharge:    Discharged to transitional care facility.    Progress towards therapy goal(s). See goals on Care Plan in [facility] electronic health record for goal details.  Goals not met.  Barriers to achieving goals:   discharge from facility.    Therapy recommendation(s):    Continued therapy is recommended.  Rationale/Recommendations:  to advance I with mobility for return to oksana eenvironment and PLOF.

## 2018-01-25 ENCOUNTER — TELEPHONE (OUTPATIENT)
Dept: UROLOGY | Facility: CLINIC | Age: 83
End: 2018-01-25

## 2018-01-25 NOTE — TELEPHONE ENCOUNTER
Tried to return phone call to Pooja, nurse manager at [facility].  Left a message. She was calling asking about orders for Kaz for them to do a PVR at their facility.  Kaz has multiple cancelled appointments with [facility] as well as Dr. Maldonado.  He hasn't been seen by us since 2015.  I spoke with [facility].  She recommended they make an appointment to see one of our providers to update us on his status before we can give any orders.  Xin Hua LPN

## 2018-02-19 ENCOUNTER — OFFICE VISIT (OUTPATIENT)
Dept: UROLOGY | Facility: CLINIC | Age: 83
End: 2018-02-19
Payer: COMMERCIAL

## 2018-02-19 VITALS
HEART RATE: 57 BPM | HEIGHT: 68 IN | WEIGHT: 147 LBS | OXYGEN SATURATION: 94 % | DIASTOLIC BLOOD PRESSURE: 74 MMHG | SYSTOLIC BLOOD PRESSURE: 124 MMHG | BODY MASS INDEX: 22.28 KG/M2

## 2018-02-19 DIAGNOSIS — N21.0 BLADDER STONES: Primary | ICD-10-CM

## 2018-02-19 PROCEDURE — 99214 OFFICE O/P EST MOD 30 MIN: CPT | Performed by: UROLOGY

## 2018-02-19 NOTE — PROGRESS NOTES
History: It is a pleasure to see this very pleasant 83-year-old gentleman in follow-up consultation.  I have not seen him for almost 3 years.  He is currently in memory care, but has had a Smith catheter in intermittently ever since a fracture of the hip in October 2017  He is now able to ambulate with the aid of a frame, was able to void for a time until the beginning of this year and since then, when they measured post void residuals of 300 he has had a catheter in since been changed every 4 weeks.  He is fairly comfortable with the catheter in at the present time to find something of a nuisance when trying to dress.  I have evaluated him in the past and had a cystoscopy in 2015 which showed that he had a distal hypospadias, some evidence of meatal stenosis at that time, and otherwise normal urethra, and little evidence of prostatic hyperplasia or trabeculation in the bladder.    No past medical history on file.    Social History     Social History     Marital status:      Spouse name: N/A     Number of children: N/A     Years of education: N/A     Social History Main Topics     Smoking status: Never Smoker     Smokeless tobacco: Never Used     Alcohol use None     Drug use: None     Sexual activity: Not Asked     Other Topics Concern     None     Social History Narrative     None       No past surgical history on file.    No family history on file.      Current Outpatient Prescriptions:      CITALOPRAM HYDROBROMIDE PO, Take 20 mg by mouth daily, Disp: , Rfl:      TAMSULOSIN HCL PO, Take 0.4 mg by mouth every morning After breakfast, Disp: , Rfl:      VITAMIN D, CHOLECALCIFEROL, PO, Take 1,000 Units by mouth daily, Disp: , Rfl:      senna-docusate (SENOKOT-S;PERICOLACE) 8.6-50 MG per tablet, Take 1 tablet by mouth 2 times daily as needed for constipation, Disp: 100 tablet, Rfl: 0     Acetaminophen (TYLENOL PO), Take 1,000 mg by mouth 3 times daily Do not exceed 4000 mg po in 24 hours., Disp: , Rfl:     10  "point ROS of systems including Constitutional, Eyes, Respiratory, Cardiovascular, Gastroenterology, Genitourinary, Integumentary, Muscularskeletal, Psychiatric were all negative except for pertinent positives noted in my HPI.    Examination:   /74 (BP Location: Left arm, Patient Position: Sitting, Cuff Size: Adult Regular)  Pulse 57  Ht 1.715 m (5' 7.5\")  Wt 66.7 kg (147 lb)  SpO2 94%  BMI 22.68 kg/m2  General Impression: Very pleasant gentleman in no acute distress, he is able to converse on some issues  Mental Status: There is evidence of some dementia.  HEENT.  There is no clinical evidence of jaundice and mucous membranes are normal  Skin: The skin is otherwise normal to examination  Respiratory System: The respiratory cycle is normal  Lymph Nodes: Not examined  Back/Flank Tenderness: Not examined  Cardiovascular System: Not examined  Abdominal Examination: Not examined  Extremities: There is no significant peripheral edema  Genitial: Not examined  Rectal Examination: Not examined  Neurologic System: There are no obvious focal acute abnormal clinical neurological signs in the central, or peripheral nervous systems    Impression: I do not think we should do a trial of voiding today is to distort the afternoon, he lives in chart the memory care unit.  I would like to arrange for a cystoscopy in the office with a possible trial of voiding as well over the previous cystoscopy showed little evidence of intravesical obstruction.  I suspect that he may have a persistently high postvoid residual for other reasons, but if this is not a problem for him and is able to void despite this without obvious problems I would not recommend catheterizing him just because the postvoid residual is 300 cc.  However I will evaluate the situation with cystoscopy to determine the integrity of the bladder outlet and then see what options we need to consider up to and including a TUR prostate if necessary.  In addition there " "was, mentioned by the family, and then been told he may have stones in the bladder.  I will therefore arrange for a KUB in addition when he comes back for cystoscopy  I did discuss his very carefully the patient's and the patient's daughter.  I answered all her questions    Plan: Cystoscopy with possible trial of voiding and a KUB    Time: 25 minutes.  Greater than 50% spent in discussion for consultation discussion with family members, review of records as I had not seen him for several years, which were extensive.    \"This dictation was performed with voice recognition software and may contain errors,  omissions and inadvertent word substitution.\"    .    "

## 2018-02-19 NOTE — MR AVS SNAPSHOT
After Visit Summary   2/19/2018    Kaz Perkins    MRN: [id number]           Patient Information     Date Of Birth          1935        Visit Information        Provider Department      2/19/2018 4:00 PM Atr Maldonado MD [facility]        Today's Diagnoses     Bladder stones    -  1       Follow-ups after your visit        Follow-up notes from your care team     Return for cystoscopy, KUB.      Your next 10 appointments already scheduled     Mar 08, 2018  2:00 PM CST   XR KUB with SHXR3   [facility] ([facility])    [address] [id number] [phone]           Please bring a list of your current medicines to your exam. (Include vitamins, minerals and over-thecounter medicines.) Leave your valuables at home.  Tell your doctor if there is a chance you may be pregnant.  You do not need to do anything special for this exam.            Mar 08, 2018  2:50 PM CST   Cystoscopy with Art Maldonado MD, UA CYF   [facility] ([facility])    [address]  [address]  [place] [id number] [phone]              Future tests that were ordered for you today     Open Future Orders        Priority Expected Expires Ordered    XR KUB [[id number]] Routine 2/19/2018 2/19/2019 2/19/2018            Who to contact     If you have questions or need follow up information about today's clinic visit or your schedule please contact [facility] directly at [phone].  Normal or non-critical lab and imaging results will be communicated to you by MyChart, letter or phone within 4 business days after the clinic has received the results. If you do not hear from us within 7 days, please contact the clinic through MyChart or phone. If you have a critical or abnormal lab result, we will notify you by phone as  "soon as possible.  Submit refill requests through [employer] or call your pharmacy and they will forward the refill request to us. Please allow 3 business days for your refill to be completed.          Additional Information About Your Visit        [employer] Information     [employer] lets you send messages to your doctor, view your test results, renew your prescriptions, schedule appointments and more. To sign up, go to [email or web address] . Click on \"Log in\" on the left side of the screen, which will take you to the Welcome page. Then click on \"Sign up Now\" on the right side of the page.     You will be asked to enter the access code listed below, as well as some personal information. Please follow the directions to create your username and password.     Your access code is: [email or web address]  Expires: 4/15/2018  3:24 PM     Your access code will  in 90 days. If you need help or a new code, please call your [facility] clinic or [phone].        Care EveryWhere ID     This is your Care EveryWhere ID. This could be used by other organizations to access your [facility] medical records  [id number]        Your Vitals Were     Pulse Height Pulse Oximetry BMI (Body Mass Index)          57 1.715 m (5' 7.5\") 94% 22.68 kg/m2         Blood Pressure from Last 3 Encounters:   18 124/74   18 132/66   01/15/18 129/76    Weight from Last 3 Encounters:   18 66.7 kg (147 lb)   18 66.4 kg (146 lb 4.8 oz)               Primary Care Provider Fax #    Physician No Ref-Primary [phone]       No address on file        Equal Access to Services     MARCELLA SOLIS : Hadchandler Duvall, john ordonez, nadine lai. So [facility] [phone].    ATENCIÓN: Si habla español, tiene a espinoza disposición servicios gratuitos de asistencia lingüística. Llame al [phone].    We comply with applicable federal civil rights laws and [place] laws. We do not " discriminate on the basis of race, color, national origin, age, disability, sex, sexual orientation, or gender identity.            Thank you!     Thank you for choosing [facility]  for your care. Our goal is always to provide you with excellent care. Hearing back from our patients is one way we can continue to improve our services. Please take a few minutes to complete the written survey that you may receive in the mail after your visit with us. Thank you!             Your Updated Medication List - Protect others around you: Learn how to safely use, store and throw away your medicines at www.disposemymeds.org.          This list is accurate as of 2/19/18  4:51 PM.  Always use your most recent med list.                   Brand Name Dispense Instructions for use Diagnosis    CITALOPRAM HYDROBROMIDE PO      Take 20 mg by mouth daily        senna-docusate 8.6-50 MG per tablet    SENOKOT-S;PERICOLACE    100 tablet    Take 1 tablet by mouth 2 times daily as needed for constipation    Other constipation       TAMSULOSIN HCL PO      Take 0.4 mg by mouth every morning After breakfast        TYLENOL PO      Take 1,000 mg by mouth 3 times daily Do not exceed 4000 mg po in 24 hours.        VITAMIN D (CHOLECALCIFEROL) PO      Take 1,000 Units by mouth daily

## 2018-02-19 NOTE — LETTER
2/19/2018       RE: Kaz Perkins  [address] [place] [address]     Dear Colleague,    Thank you for referring your patient, Kaz Perkins, to the [facility] [place] at [facility]. Please see a copy of my visit note below.    History: It is a pleasure to see this very pleasant 83-year-old gentleman in follow-up consultation.  I have not seen him for almost 3 years.  He is currently in memory care, but has had a Smith catheter in intermittently ever since a fracture of the hip in October 2017  He is now able to ambulate with the aid of a frame, was able to void for a time until the beginning of this year and since then, when they measured post void residuals of 300 he has had a catheter in since been changed every 4 weeks.  He is fairly comfortable with the catheter in at the present time to find something of a nuisance when trying to dress.  I have evaluated him in the past and had a cystoscopy in 2015 which showed that he had a distal hypospadias, some evidence of meatal stenosis at that time, and otherwise normal urethra, and little evidence of prostatic hyperplasia or trabeculation in the bladder.    No past medical history on file.    Social History     Social History     Marital status:      Spouse name: N/A     Number of children: N/A     Years of education: N/A     Social History Main Topics     Smoking status: Never Smoker     Smokeless tobacco: Never Used     Alcohol use None     Drug use: None     Sexual activity: Not Asked     Other Topics Concern     None     Social History Narrative     None       No past surgical history on file.    No family history on file.      Current Outpatient Prescriptions:      CITALOPRAM HYDROBROMIDE PO, Take 20 mg by mouth daily, Disp: , Rfl:      TAMSULOSIN HCL PO, Take 0.4 mg by mouth every morning After breakfast, Disp: , Rfl:      VITAMIN D, CHOLECALCIFEROL, PO, Take 1,000  "Units by mouth daily, Disp: , Rfl:      senna-docusate (SENOKOT-S;PERICOLACE) 8.6-50 MG per tablet, Take 1 tablet by mouth 2 times daily as needed for constipation, Disp: 100 tablet, Rfl: 0     Acetaminophen (TYLENOL PO), Take 1,000 mg by mouth 3 times daily Do not exceed 4000 mg po in 24 hours., Disp: , Rfl:     10 point ROS of systems including Constitutional, Eyes, Respiratory, Cardiovascular, Gastroenterology, Genitourinary, Integumentary, Muscularskeletal, Psychiatric were all negative except for pertinent positives noted in my HPI.    Examination:   /74 (BP Location: Left arm, Patient Position: Sitting, Cuff Size: Adult Regular)  Pulse 57  Ht 1.715 m (5' 7.5\")  Wt 66.7 kg (147 lb)  SpO2 94%  BMI 22.68 kg/m2  General Impression: Very pleasant gentleman in no acute distress, he is able to converse on some issues  Mental Status: There is evidence of some dementia.  HEENT.  There is no clinical evidence of jaundice and mucous membranes are normal  Skin: The skin is otherwise normal to examination  Respiratory System: The respiratory cycle is normal  Lymph Nodes: Not examined  Back/Flank Tenderness: Not examined  Cardiovascular System: Not examined  Abdominal Examination: Not examined  Extremities: There is no significant peripheral edema  Genitial: Not examined  Rectal Examination: Not examined  Neurologic System: There are no obvious focal acute abnormal clinical neurological signs in the central, or peripheral nervous systems    Impression: I do not think we should do a trial of voiding today is to distort the afternoon, he lives in chart the memory care unit.  I would like to arrange for a cystoscopy in the office with a possible trial of voiding as well over the previous cystoscopy showed little evidence of intravesical obstruction.  I suspect that he may have a persistently high postvoid residual for other reasons, but if this is not a problem for him and is able to void despite this without " "obvious problems I would not recommend catheterizing him just because the postvoid residual is 300 cc.  However I will evaluate the situation with cystoscopy to determine the integrity of the bladder outlet and then see what options we need to consider up to and including a TUR prostate if necessary.  In addition there was, mentioned by the family, and then been told he may have stones in the bladder.  I will therefore arrange for a KUB in addition when he comes back for cystoscopy  I did discuss his very carefully the patient's and the patient's daughter.  I answered all her questions    Plan: Cystoscopy with possible trial of voiding and a KUB    Time: 25 minutes.  Greater than 50% spent in discussion for consultation discussion with family members, review of records as I had not seen him for several years, which were extensive.    \"This dictation was performed with voice recognition software and may contain errors,  omissions and inadvertent word substitution.\"    .      Again, thank you for allowing me to participate in the care of your patient.      Sincerely,    Art Maldonado MD      "

## 2018-02-19 NOTE — LETTER
Date:February 20, 2018      Patient was self referred, no letter generated. Do not send.        [facility] Information

## 2018-03-08 ENCOUNTER — OFFICE VISIT (OUTPATIENT)
Dept: UROLOGY | Facility: CLINIC | Age: 83
End: 2018-03-08
Payer: COMMERCIAL

## 2018-03-08 ENCOUNTER — HOSPITAL ENCOUNTER (OUTPATIENT)
Dept: GENERAL RADIOLOGY | Facility: CLINIC | Age: 83
Discharge: HOME | End: 2018-03-08
Attending: UROLOGY | Admitting: UROLOGY
Payer: MEDICARE

## 2018-03-08 VITALS
BODY MASS INDEX: 22.28 KG/M2 | DIASTOLIC BLOOD PRESSURE: 70 MMHG | SYSTOLIC BLOOD PRESSURE: 122 MMHG | HEART RATE: 80 BPM | WEIGHT: 147 LBS | HEIGHT: 68 IN

## 2018-03-08 DIAGNOSIS — N21.0 BLADDER STONES: ICD-10-CM

## 2018-03-08 DIAGNOSIS — Z79.2 PROPHYLACTIC ANTIBIOTIC: Primary | ICD-10-CM

## 2018-03-08 DIAGNOSIS — R33.9 URINARY RETENTION: Primary | ICD-10-CM

## 2018-03-08 PROCEDURE — 52000 CYSTOURETHROSCOPY: CPT | Performed by: UROLOGY

## 2018-03-08 PROCEDURE — 74019 RADEX ABDOMEN 2 VIEWS: CPT

## 2018-03-08 PROCEDURE — 99212 OFFICE O/P EST SF 10 MIN: CPT | Mod: 25 | Performed by: UROLOGY

## 2018-03-08 RX ORDER — CIPROFLOXACIN 500 MG/1
500 TABLET, FILM COATED ORAL 2 TIMES DAILY
Qty: 4 TABLET | Refills: 0 | Status: SHIPPED | OUTPATIENT
Start: 2018-03-08 | End: 2018-03-10

## 2018-03-08 RX ORDER — CIPROFLOXACIN 500 MG/1
500 TABLET, FILM COATED ORAL ONCE
Qty: 1 TABLET | Refills: 0 | Status: SHIPPED | OUTPATIENT
Start: 2018-03-08 | End: 2018-03-08

## 2018-03-08 ASSESSMENT — PAIN SCALES - GENERAL: PAINLEVEL: NO PAIN (0)

## 2018-03-08 NOTE — LETTER
Date:March 9, 2018      Patient was self referred, no letter generated. Do not send.        [facility] Information

## 2018-03-08 NOTE — PATIENT INSTRUCTIONS

## 2018-03-08 NOTE — MR AVS SNAPSHOT
"              After Visit Summary   3/8/2018    Kaz Perkins    MRN: [id number]           Patient Information     Date Of Birth          1935        Visit Information        Provider Department      3/8/2018 2:50 PM Art Maldonado MD; [facility] [place]        Today's Diagnoses     Prophylactic antibiotic    -  1      Care Instructions         AFTER YOUR CYSTOSCOPY         You have just completed a cystoscopy, or \"cysto\", which allowed your physician to learn more about your bladder (or to remove a stent placed after surgery). We suggest that you continue to avoid caffeine, fruit juice, and alcohol for the next 24 hours, however, you are encouraged to return to your normal activities.       A few things that are considered normal after your cystoscopy:    * small amount of bleeding (or spotting) that clears within the next 24 hours    * slight burning sensation with urination    * sensation to of needing to avoid more frequently    * the feeling of \"air\" in your urine    * mild discomfort that is relieved with Tylonol        Please contact our office promptly if you:    * develop a fever above 101 degrees    * are unable to urinate    * develop bright red blood that does not stop    * severe pain or swelling        And of course, please contact our office with any concerns or questions [phone]      AFTER YOUR CYSTOSCOPY        You have just completed a cystoscopy, or \"cysto\", which allowed your physician to learn more about your bladder (or to remove a stent placed after surgery). We suggest that you continue to avoid caffeine, fruit juice, and alcohol for the next 24 hours, however, you are encouraged to return to your normal activities.         A few things that are considered normal after your cystoscopy:     * Small amount of bleeding (or spotting) that clears within the next 24 hours     * Slight burning sensation with urination     * Sensation " "to of needing to avoid more frequently     * The feeling of \"air\" in your urine     * Mild discomfort that is relieved with Tylenol        Please contact our office promptly if you:     * Develop a fever above 101 degrees     * Are unable to urinate     * Develop bright red blood that does not stop     * Severe pain or swelling         Please contact our office with any concerns or questions @[facility].          Follow-ups after your visit        Follow-up notes from your care team     Return for Schedule surgery.      Who to contact     If you have questions or need follow up information about today's clinic visit or your schedule please contact [facility] [place] directly at [phone].  Normal or non-critical lab and imaging results will be communicated to you by Ikonisyshart, letter or phone within 4 business days after the clinic has received the results. If you do not hear from us within 7 days, please contact the clinic through [employer]Ey[employer] or phone. If you have a critical or abnormal lab result, we will notify you by phone as soon as possible.  Submit refill requests through CorvisaCloud or call your pharmacy and they will forward the refill request to us. Please allow 3 business days for your refill to be completed.          Additional Information About Your Visit        [employer] Information     [employer] lets you send messages to your doctor, view your test results, renew your prescriptions, schedule appointments and more. To sign up, go to [email or web address] . Click on \"Log in\" on the left side of the screen, which will take you to the Welcome page. Then click on \"Sign up Now\" on the right side of the page.     You will be asked to enter the access code listed below, as well as some personal information. Please follow the directions to create your username and password.     Your access code is: [email or web address]  Expires: 4/15/2018  3:24 PM     Your access code will  in 90 days. If " "you need help or a new code, please call your [place] clinic or [phone].        Care EveryWhere ID     This is your Care EveryWhere ID. This could be used by other organizations to access your [place] medical records  [id number]        Your Vitals Were     Pulse Height BMI (Body Mass Index)             80 1.715 m (5' 7.5\") 22.68 kg/m2          Blood Pressure from Last 3 Encounters:   03/08/18 122/70   02/19/18 124/74   01/16/18 132/66    Weight from Last 3 Encounters:   03/08/18 66.7 kg (147 lb)   02/19/18 66.7 kg (147 lb)   01/11/18 66.4 kg (146 lb 4.8 oz)              We Performed the Following     CYSTOURETHROSCOPY          Today's Medication Changes          These changes are accurate as of 3/8/18  3:50 PM.  If you have any questions, ask your nurse or doctor.               Start taking these medicines.        Dose/Directions    * ciprofloxacin 500 MG tablet   Commonly known as:  CIPRO   Used for:  Prophylactic antibiotic   Started by:  Art Maldonado MD        Dose:  500 mg   Take 1 tablet (500 mg) by mouth once for 1 dose   Quantity:  1 tablet   Refills:  0       * ciprofloxacin 500 MG tablet   Commonly known as:  CIPRO   Used for:  Prophylactic antibiotic   Started by:  Art Maldonado MD        Dose:  500 mg   Take 1 tablet (500 mg) by mouth 2 times daily for 2 days   Quantity:  4 tablet   Refills:  0       * Notice:  This list has 2 medication(s) that are the same as other medications prescribed for you. Read the directions carefully, and ask your doctor or other care provider to review them with you.         Where to get your medicines      These medications were sent to [facility] [place] [facility] - [address]  [address] [facility] [address]     Phone:  [phone]     ciprofloxacin 500 MG tablet         Some of these will need a paper prescription and others can be bought over the counter.  Ask your nurse if you have questions.     Bring a paper " prescription for each of these medications     ciprofloxacin 500 MG tablet                Primary Care Provider Fax #    Physician No Ref-Primary [phone]       No address on file        Equal Access to Services     MARCELLA SOLIS : Hadii aad ku hadrhondalupe Duvall, jaydejazmín allenaparnaha, austynsmitha hanleyluis hernandez, nadine talaverajunie fenton. So [facility] [phone].    ATENCIÓN: Si habla español, tiene a espinoza disposición servicios gratuitos de asistencia lingüística. Llame al [phone].    We comply with applicable federal civil rights laws and [place] laws. We do not discriminate on the basis of race, color, national origin, age, disability, sex, sexual orientation, or gender identity.            Thank you!     Thank you for choosing [facility] [place]  for your care. Our goal is always to provide you with excellent care. Hearing back from our patients is one way we can continue to improve our services. Please take a few minutes to complete the written survey that you may receive in the mail after your visit with us. Thank you!             Your Updated Medication List - Protect others around you: Learn how to safely use, store and throw away your medicines at www.disposemymeds.org.          This list is accurate as of 3/8/18  3:50 PM.  Always use your most recent med list.                   Brand Name Dispense Instructions for use Diagnosis    * ciprofloxacin 500 MG tablet    CIPRO    1 tablet    Take 1 tablet (500 mg) by mouth once for 1 dose    Prophylactic antibiotic       * ciprofloxacin 500 MG tablet    CIPRO    4 tablet    Take 1 tablet (500 mg) by mouth 2 times daily for 2 days    Prophylactic antibiotic       CITALOPRAM HYDROBROMIDE PO      Take 20 mg by mouth daily        senna-docusate 8.6-50 MG per tablet    SENOKOT-S;PERICOLACE    100 tablet    Take 1 tablet by mouth 2 times daily as needed for constipation    Other constipation       TAMSULOSIN HCL PO      Take  0.4 mg by mouth every morning After breakfast        TYLENOL PO      Take 1,000 mg by mouth 3 times daily Do not exceed 4000 mg po in 24 hours.        VITAMIN D (CHOLECALCIFEROL) PO      Take 1,000 Units by mouth daily        * Notice:  This list has 2 medication(s) that are the same as other medications prescribed for you. Read the directions carefully, and ask your doctor or other care provider to review them with you.

## 2018-03-08 NOTE — LETTER
3/8/2018       RE: Kaz Perkins  [address]  [place] [address]     Dear Colleague,    Thank you for referring your patient, Kaz Perkins, to the [facility] [place] at [facility]. Please see a copy of my visit note below.    This pleasant 83-year-old gentleman returns today for cystoscopy.  We recall, he had a fracture of the right hip in October 2017.  He had problems with urinary retention after that although this step.  Appeared to resolve by January of this year when he was able to void.  However he then developed retention again and has had a catheter in since.  We will also concerned about the possibility of stones in the bladder.  A KUB today, although not yet reported, does show multiple phleboliths in the pelvis and what appears also to be calcification of the prostate.     Procedure.  Cystoscopy.   Surgeon.    Erica  Anesthesia.  Local anesthesia.  Description.  With the patient in the supine position, with the genital area prepped and draped in the customary fashion, with local anesthetic and the urethra, the flexible cystoscope was Inserted.  Penile urethra is normal.  External sphincter intact.  3 cm prostatic urethra with only mild lateral lobe hyperplasia.  There is a high median bar.  There is very marked trabeculation in the bladder with saccule formation.  There is no evidence of neoplasm or stone in the bladder.  There are no other remarkable features.    Impression.  Compared to cystoscopy 3 years ago, the changes now are such that at that time there was only minimal trabeculation and now it seems very marked with saccule formation.  Although the prostate gland is quite small, in my opinion it seems to be the most likely cause of his problems with attention.  I discussed therefore that we should plan to do a Quanta laser TUR of the prostate to open up the channel and try and improve chances  "of him voiding spontaneously.  I did not see any stones in the bladder but we may encounter stones in the prostate through the course of this procedure.  I discussed the entire situation carefully the detail today.  I answered all the questions    Plan.  Quanta laser TUR prostate    Time.  10 minutes were spent in addition to the cystoscopy in order to discuss the findings, to review the KUB, to talk about about alternatives and to discuss the planned procedure as noted above.    \"This dictation was performed with voice recognition software and may contain errors,  omissions and inadvertent word substitution.\"      Again, thank you for allowing me to participate in the care of your patient.      Sincerely,    Art Maldonado MD      "

## 2018-03-08 NOTE — NURSING NOTE
Chief Complaint   Patient presents with     Cystoscopy     History of Recurrent UTI's      Prior to the start of the procedure and with procedural staff participation, I verbally confirmed the patient s identity using two indicators, relevant allergies, that the procedure was appropriate and matched the consent or emergent situation, and that the correct equipment/implants were available. Immediately prior to starting the procedure I conducted the Time Out with the procedural staff and re-confirmed the patient s name, procedure, and site/side. (The Joint Commission universal protocol was followed.)  Yes    Sedation (Moderate or Deep): None    Maria Esther Alex LPN

## 2018-03-09 NOTE — NURSING NOTE
Catheter removal documentation on 3/9/2018:    Kaz Perkins presents to the clinic for catheter removal.  Reason for removal: Cystoscopy  Order has been verified. Dr. Maldonado  Catheter successfully removed at 2:46 PM without immediate complication.  Urethral meatus is free of secretions and encrustation.  The patient is afebrile.  The patient tolerated the procedure     Oli Grande LPN      Catheter insertion documentation on 3/9/2018:    Kaz Perkins presents to the clinic for catheter insertion.  Reason for insertion: Replacement  Order has been verified. Dr. Maldonado  Catheter successfully inserted into the urethral meatus in the usual sterile fashion without immediate complication.  Type of catheter placed: 16 Luxembourgish indwelling catheter  Urine is yellow in color.  325 cc's of urine output returned.  Balloon was filled with 10 cc's of normal saline.  Securement device placed for the catheter.  The patient tolerated the procedure and was instructed to return or call for pain, fever, leakage or decreased urine flow    Oli Grande LPN

## 2018-03-26 ENCOUNTER — RECORDS - HEALTHEAST (OUTPATIENT)
Dept: LAB | Facility: CLINIC | Age: 83
End: 2018-03-26

## 2018-03-26 LAB
ALBUMIN UR-MCNC: ABNORMAL MG/DL
AMORPH CRY #/AREA URNS HPF: ABNORMAL /[HPF]
APPEARANCE UR: ABNORMAL
BACTERIA #/AREA URNS HPF: ABNORMAL HPF
BILIRUB UR QL STRIP: NEGATIVE
CAOX CRY #/AREA URNS HPF: PRESENT /[HPF]
COLOR UR AUTO: YELLOW
GLUCOSE UR STRIP-MCNC: NEGATIVE MG/DL
HGB UR QL STRIP: ABNORMAL
HYALINE CASTS #/AREA URNS LPF: ABNORMAL LPF
KETONES UR STRIP-MCNC: NEGATIVE MG/DL
LEUKOCYTE ESTERASE UR QL STRIP: ABNORMAL
MUCOUS THREADS #/AREA URNS LPF: ABNORMAL LPF
NITRATE UR QL: POSITIVE
PH UR STRIP: 6 [PH] (ref 4.5–8)
RBC #/AREA URNS AUTO: >100 HPF
SP GR UR STRIP: 1.02 (ref 1–1.03)
SQUAMOUS #/AREA URNS AUTO: ABNORMAL LPF
TRANS CELLS #/AREA URNS HPF: ABNORMAL LPF
UROBILINOGEN UR STRIP-ACNC: ABNORMAL
WBC #/AREA URNS AUTO: >100 HPF
WBC CLUMPS #/AREA URNS HPF: PRESENT /[HPF]

## 2018-03-29 LAB
BACTERIA SPEC CULT: ABNORMAL
BACTERIA SPEC CULT: ABNORMAL

## 2018-04-05 ENCOUNTER — ANESTHESIA EVENT (OUTPATIENT)
Dept: SURGERY | Facility: CLINIC | Age: 83
End: 2018-04-05
Payer: MEDICARE

## 2018-04-05 ENCOUNTER — ANESTHESIA (OUTPATIENT)
Dept: SURGERY | Facility: CLINIC | Age: 83
End: 2018-04-05
Payer: MEDICARE

## 2018-04-05 ENCOUNTER — HOSPITAL ENCOUNTER (OUTPATIENT)
Facility: CLINIC | Age: 83
Discharge: HOME | End: 2018-04-06
Attending: UROLOGY | Admitting: UROLOGY
Payer: MEDICARE

## 2018-04-05 DIAGNOSIS — R33.9 URINARY RETENTION: ICD-10-CM

## 2018-04-05 PROCEDURE — 71000012 ZZH RECOVERY PHASE 1 LEVEL 1 FIRST HR: Performed by: UROLOGY

## 2018-04-05 PROCEDURE — 27210995 ZZH RX 272: Performed by: UROLOGY

## 2018-04-05 PROCEDURE — 40000170 ZZH STATISTIC PRE-PROCEDURE ASSESSMENT II: Performed by: UROLOGY

## 2018-04-05 PROCEDURE — 25000128 H RX IP 250 OP 636: Performed by: UROLOGY

## 2018-04-05 PROCEDURE — 88305 TISSUE EXAM BY PATHOLOGIST: CPT | Performed by: UROLOGY

## 2018-04-05 PROCEDURE — A9270 NON-COVERED ITEM OR SERVICE: HCPCS | Mod: GY | Performed by: UROLOGY

## 2018-04-05 PROCEDURE — 25000125 ZZHC RX 250: Performed by: ANESTHESIOLOGY

## 2018-04-05 PROCEDURE — 25000125 ZZHC RX 250: Performed by: NURSE ANESTHETIST, CERTIFIED REGISTERED

## 2018-04-05 PROCEDURE — 88305 TISSUE EXAM BY PATHOLOGIST: CPT | Mod: 26 | Performed by: UROLOGY

## 2018-04-05 PROCEDURE — 25800025 ZZH RX 258: Performed by: UROLOGY

## 2018-04-05 PROCEDURE — 25000128 H RX IP 250 OP 636: Performed by: ANESTHESIOLOGY

## 2018-04-05 PROCEDURE — 25000125 ZZHC RX 250: Mod: GY | Performed by: UROLOGY

## 2018-04-05 PROCEDURE — 36000075 ZZH SURGERY LEVEL 6 EA 15 ADDTL MIN: Performed by: UROLOGY

## 2018-04-05 PROCEDURE — 25000128 H RX IP 250 OP 636: Performed by: NURSE ANESTHETIST, CERTIFIED REGISTERED

## 2018-04-05 PROCEDURE — 37000008 ZZH ANESTHESIA TECHNICAL FEE, 1ST 30 MIN: Performed by: UROLOGY

## 2018-04-05 PROCEDURE — 36000073 ZZH SURGERY LEVEL 6 1ST 30 MIN: Performed by: UROLOGY

## 2018-04-05 PROCEDURE — 27210794 ZZH OR GENERAL SUPPLY STERILE: Performed by: UROLOGY

## 2018-04-05 PROCEDURE — 25000132 ZZH RX MED GY IP 250 OP 250 PS 637: Mod: GY | Performed by: UROLOGY

## 2018-04-05 PROCEDURE — 71000013 ZZH RECOVERY PHASE 1 LEVEL 1 EA ADDTL HR: Performed by: UROLOGY

## 2018-04-05 PROCEDURE — 52601 PROSTATECTOMY (TURP): CPT | Performed by: UROLOGY

## 2018-04-05 PROCEDURE — 37000009 ZZH ANESTHESIA TECHNICAL FEE, EACH ADDTL 15 MIN: Performed by: UROLOGY

## 2018-04-05 RX ORDER — ONDANSETRON 2 MG/ML
4 INJECTION INTRAMUSCULAR; INTRAVENOUS EVERY 6 HOURS PRN
Status: DISCONTINUED | OUTPATIENT
Start: 2018-04-05 | End: 2018-04-06 | Stop reason: HOSPADM

## 2018-04-05 RX ORDER — SODIUM CHLORIDE, SODIUM LACTATE, POTASSIUM CHLORIDE, CALCIUM CHLORIDE 600; 310; 30; 20 MG/100ML; MG/100ML; MG/100ML; MG/100ML
INJECTION, SOLUTION INTRAVENOUS CONTINUOUS PRN
Status: DISCONTINUED | OUTPATIENT
Start: 2018-04-05 | End: 2018-04-05

## 2018-04-05 RX ORDER — NALOXONE HYDROCHLORIDE 0.4 MG/ML
.1-.4 INJECTION, SOLUTION INTRAMUSCULAR; INTRAVENOUS; SUBCUTANEOUS
Status: DISCONTINUED | OUTPATIENT
Start: 2018-04-05 | End: 2018-04-06 | Stop reason: HOSPADM

## 2018-04-05 RX ORDER — NALOXONE HYDROCHLORIDE 0.4 MG/ML
.1-.4 INJECTION, SOLUTION INTRAMUSCULAR; INTRAVENOUS; SUBCUTANEOUS
Status: DISCONTINUED | OUTPATIENT
Start: 2018-04-05 | End: 2018-04-05

## 2018-04-05 RX ORDER — SODIUM CHLORIDE, SODIUM LACTATE, POTASSIUM CHLORIDE, CALCIUM CHLORIDE 600; 310; 30; 20 MG/100ML; MG/100ML; MG/100ML; MG/100ML
INJECTION, SOLUTION INTRAVENOUS CONTINUOUS
Status: DISCONTINUED | OUTPATIENT
Start: 2018-04-05 | End: 2018-04-06

## 2018-04-05 RX ORDER — CITALOPRAM HYDROBROMIDE 20 MG/1
20 TABLET ORAL DAILY
Status: DISCONTINUED | OUTPATIENT
Start: 2018-04-05 | End: 2018-04-06 | Stop reason: HOSPADM

## 2018-04-05 RX ORDER — ONDANSETRON 4 MG/1
4 TABLET, ORALLY DISINTEGRATING ORAL EVERY 6 HOURS PRN
Status: DISCONTINUED | OUTPATIENT
Start: 2018-04-05 | End: 2018-04-06 | Stop reason: HOSPADM

## 2018-04-05 RX ORDER — FENTANYL CITRATE 50 UG/ML
25-50 INJECTION, SOLUTION INTRAMUSCULAR; INTRAVENOUS
Status: DISCONTINUED | OUTPATIENT
Start: 2018-04-05 | End: 2018-04-05 | Stop reason: HOSPADM

## 2018-04-05 RX ORDER — CEFAZOLIN SODIUM 1 G
1 VIAL (EA) INJECTION SEE ADMIN INSTRUCTIONS
Status: DISCONTINUED | OUTPATIENT
Start: 2018-04-05 | End: 2018-04-05 | Stop reason: HOSPADM

## 2018-04-05 RX ORDER — SODIUM CHLORIDE, SODIUM LACTATE, POTASSIUM CHLORIDE, CALCIUM CHLORIDE 600; 310; 30; 20 MG/100ML; MG/100ML; MG/100ML; MG/100ML
INJECTION, SOLUTION INTRAVENOUS CONTINUOUS
Status: DISCONTINUED | OUTPATIENT
Start: 2018-04-05 | End: 2018-04-05 | Stop reason: HOSPADM

## 2018-04-05 RX ORDER — LIDOCAINE 40 MG/G
CREAM TOPICAL
Status: DISCONTINUED | OUTPATIENT
Start: 2018-04-05 | End: 2018-04-06 | Stop reason: HOSPADM

## 2018-04-05 RX ORDER — CEFAZOLIN SODIUM 2 G/100ML
2 INJECTION, SOLUTION INTRAVENOUS
Status: COMPLETED | OUTPATIENT
Start: 2018-04-05 | End: 2018-04-05

## 2018-04-05 RX ORDER — PROPOFOL 10 MG/ML
INJECTION, EMULSION INTRAVENOUS PRN
Status: DISCONTINUED | OUTPATIENT
Start: 2018-04-05 | End: 2018-04-05

## 2018-04-05 RX ORDER — KETOROLAC TROMETHAMINE 10 MG/1
10 TABLET, FILM COATED ORAL EVERY 4 HOURS PRN
Status: DISCONTINUED | OUTPATIENT
Start: 2018-04-05 | End: 2018-04-05

## 2018-04-05 RX ORDER — NEOMYCIN/BACITRACIN/POLYMYXINB 3.5-400-5K
OINTMENT (GRAM) TOPICAL 4 TIMES DAILY PRN
Status: DISCONTINUED | OUTPATIENT
Start: 2018-04-05 | End: 2018-04-06 | Stop reason: HOSPADM

## 2018-04-05 RX ORDER — IBUPROFEN 600 MG/1
600 TABLET, FILM COATED ORAL EVERY 6 HOURS PRN
Status: DISCONTINUED | OUTPATIENT
Start: 2018-04-05 | End: 2018-04-06 | Stop reason: HOSPADM

## 2018-04-05 RX ORDER — FENTANYL CITRATE 50 UG/ML
INJECTION, SOLUTION INTRAMUSCULAR; INTRAVENOUS PRN
Status: DISCONTINUED | OUTPATIENT
Start: 2018-04-05 | End: 2018-04-05

## 2018-04-05 RX ORDER — ONDANSETRON 4 MG/1
4 TABLET, ORALLY DISINTEGRATING ORAL EVERY 30 MIN PRN
Status: DISCONTINUED | OUTPATIENT
Start: 2018-04-05 | End: 2018-04-05 | Stop reason: HOSPADM

## 2018-04-05 RX ORDER — ONDANSETRON 2 MG/ML
4 INJECTION INTRAMUSCULAR; INTRAVENOUS EVERY 30 MIN PRN
Status: DISCONTINUED | OUTPATIENT
Start: 2018-04-05 | End: 2018-04-05 | Stop reason: HOSPADM

## 2018-04-05 RX ORDER — HYDROMORPHONE HYDROCHLORIDE 1 MG/ML
.3-.5 INJECTION, SOLUTION INTRAMUSCULAR; INTRAVENOUS; SUBCUTANEOUS EVERY 5 MIN PRN
Status: DISCONTINUED | OUTPATIENT
Start: 2018-04-05 | End: 2018-04-05 | Stop reason: HOSPADM

## 2018-04-05 RX ORDER — BUPIVACAINE HYDROCHLORIDE 7.5 MG/ML
INJECTION, SOLUTION INTRASPINAL PRN
Status: DISCONTINUED | OUTPATIENT
Start: 2018-04-05 | End: 2018-04-05

## 2018-04-05 RX ORDER — PROPOFOL 10 MG/ML
INJECTION, EMULSION INTRAVENOUS CONTINUOUS PRN
Status: DISCONTINUED | OUTPATIENT
Start: 2018-04-05 | End: 2018-04-05

## 2018-04-05 RX ORDER — MAGNESIUM HYDROXIDE 1200 MG/15ML
LIQUID ORAL PRN
Status: DISCONTINUED | OUTPATIENT
Start: 2018-04-05 | End: 2018-04-05 | Stop reason: HOSPADM

## 2018-04-05 RX ORDER — ONDANSETRON 2 MG/ML
INJECTION INTRAMUSCULAR; INTRAVENOUS PRN
Status: DISCONTINUED | OUTPATIENT
Start: 2018-04-05 | End: 2018-04-05

## 2018-04-05 RX ADMIN — SODIUM CHLORIDE, POTASSIUM CHLORIDE, SODIUM LACTATE AND CALCIUM CHLORIDE: 600; 310; 30; 20 INJECTION, SOLUTION INTRAVENOUS at 11:07

## 2018-04-05 RX ADMIN — SODIUM CHLORIDE, POTASSIUM CHLORIDE, SODIUM LACTATE AND CALCIUM CHLORIDE: 600; 310; 30; 20 INJECTION, SOLUTION INTRAVENOUS at 12:22

## 2018-04-05 RX ADMIN — FENTANYL CITRATE 50 MCG: 50 INJECTION, SOLUTION INTRAMUSCULAR; INTRAVENOUS at 11:52

## 2018-04-05 RX ADMIN — CEFAZOLIN SODIUM 2 G: 2 INJECTION, SOLUTION INTRAVENOUS at 11:57

## 2018-04-05 RX ADMIN — PROPOFOL 20 MG: 10 INJECTION, EMULSION INTRAVENOUS at 11:53

## 2018-04-05 RX ADMIN — PROPOFOL 20 MG: 10 INJECTION, EMULSION INTRAVENOUS at 11:54

## 2018-04-05 RX ADMIN — SODIUM CHLORIDE, POTASSIUM CHLORIDE, SODIUM LACTATE AND CALCIUM CHLORIDE: 600; 310; 30; 20 INJECTION, SOLUTION INTRAVENOUS at 16:52

## 2018-04-05 RX ADMIN — CITALOPRAM HYDROBROMIDE 20 MG: 20 TABLET ORAL at 16:02

## 2018-04-05 RX ADMIN — BUPIVACAINE HYDROCHLORIDE IN DEXTROSE 12 MG: 7.5 INJECTION, SOLUTION SUBARACHNOID at 11:56

## 2018-04-05 RX ADMIN — ONDANSETRON 4 MG: 2 INJECTION INTRAMUSCULAR; INTRAVENOUS at 11:59

## 2018-04-05 RX ADMIN — PROPOFOL 25 MCG/KG/MIN: 10 INJECTION, EMULSION INTRAVENOUS at 12:05

## 2018-04-05 ASSESSMENT — ENCOUNTER SYMPTOMS: DYSRHYTHMIAS: 1

## 2018-04-05 NOTE — ANESTHESIA CARE TRANSFER NOTE
Patient: Kaz Perkins    Procedure(s):  CYSTOSCOPY TRANSURETHRAL RESECTION OF THE PROSTATE WITH THE QUANTA LASER (OFFICE WILL SET UP REP WITH [facility])  - Wound Class: II-Clean Contaminated    Diagnosis: URINARY RETENTION AND BPH  Diagnosis Additional Information: No value filed.    Anesthesia Type:   Spinal     Note:  Airway :Face Mask  Patient transferred to:PACU  Comments: Pt exhibits spont resps, all monitors and alarms on in pacu, report given to RN, [email or web address] Report: Identifed the Patient, Identified the Reponsible Provider, Reviewed the pertinent medical history, Discussed the surgical course, Reviewed Intra-OP anesthesia mangement and issues during anesthesia, Set expectations for post-procedure period and Allowed opportunity for questions and acknowledgement of understanding      Vitals: (Last set prior to Anesthesia Care Transfer)    CRNA VITALS  4/5/2018 1219 - 4/5/2018 1254      4/5/2018             Pulse: 57    SpO2: 98 %    Resp Rate (set): 10                Electronically Signed By: KHADRA Cooley CRNA  April 5, 2018  12:54 PM

## 2018-04-05 NOTE — IP AVS SNAPSHOT
MRN:[id number]                      After Visit Summary   4/5/2018    Kaz Perkins    MRN: [id number]           Thank you!     Thank you for choosing [facility] for your care. Our goal is always to provide you with excellent care. Hearing back from our patients is one way we can continue to improve our services. Please take a few minutes to complete the written survey that you may receive in the mail after you visit with us. Thank you!        Patient Information     Date Of Birth          1935        Designated Caregiver       Most Recent Value    Caregiver    Will someone help with your care after discharge? yes    Name of designated caregiver [facility]    Phone number of caregiver [phone]    Caregiver address [address]      About your hospital stay     You were admitted on:  April 5, 2018 You last received care in the:  [place] [address] [facility]    You were discharged on:  April 6, 2018        Reason for your hospital stay       Surgery                  Who to Call     For medical emergencies, please call 911.  For non-urgent questions about your medical care, please call your primary care provider or clinic, None  For questions related to your surgery, please call your surgery clinic        Attending Provider     Provider Specialty    Art Maldonado MD Urology       Primary Care Provider Fax #    Physician No Ref-Primary [phone]       When to contact your care team       Fevers, chills, nausea, vomiting, uncontrolled pain, passing large blood clots.   [phone]                  After Care Instructions     Activity       Your activity upon discharge: No lifting >10 lbs x 4 weeks            Diet       Follow this diet upon discharge: Regular            No lifting        No lifting over 10 lbs and no strenuous physical activity for 4 weeks                  Follow-up Appointments     Follow-up and  "recommended labs and tests       [place] of void mid-next week. Our office will call to coordinate.                  Your next 10 appointments already scheduled     Apr 12, 2018  1:30 PM CDT   Nurse Visit with UA NURSE   [facility] [place] ([facility] [place])    [address]  [address]  [place] [address]   [phone]            May 04, 2018 10:30 AM CDT   Post-Op with Batsheva Nolan PA-C   [facility] [place] ([facility] [place])    [address]  [address]  [place] [address]   [phone]              Pending Results     Date and Time Order Name Status Description    4/5/2018 1234 Surgical pathology exam In process             Statement of Approval     Ordered          04/06/18 0836  I have reviewed and agree with all the recommendations and orders detailed in this document.  EFFECTIVE NOW     Approved and electronically signed by:  Batsheva Nolan PA-C             Admission Information     Date & Time Provider Department Dept. Phone    4/5/2018 Art Maldonado MD [place] [address] [facility] [phone]      Your Vitals Were     Blood Pressure Pulse Temperature Respirations Height Weight    156/68 (BP Location: Right arm) 62 96  F (35.6  C) (Oral) 16 1.727 m (5' 8\") 67.8 kg (149 lb 7.6 oz)    Pulse Oximetry BMI (Body Mass Index)                92% 22.73 kg/m2          [employer] Information     [employer] lets you send messages to your doctor, view your test results, renew your prescriptions, schedule appointments and more. To sign up, go to [email or web address] . Click on \"Log in\" on the left side of the screen, which will take you to the Welcome page. Then click on \"Sign up Now\" on the right side of the page.     You will be asked to enter the access code listed below, as well as some personal information. Please follow the directions to create your username and password.     Your access code is: " [email or web address]  Expires: 4/15/2018  4:24 PM     Your access code will  in 90 days. If you need help or a new code, please call your [facility] clinic or [phone].        Care EveryWhere ID     This is your Care EveryWhere ID. This could be used by other organizations to access your [facility] medical records  [id number]        Equal Access to Services     [facility] Center[facility]EN[facility] : Hadii aad ku hadasho Soomaali, waaxda luqadaha, qaybta kaalmada ademonserratyada, waxay littlein haysbn ngozimonserrat benito laJorge Luisjunie . So [facility] [phone].    ATENCIÓN: Si habla español, tiene a espinoza disposición servicios gratuitos de asistencia lingüística. Katherine al [phone].    We comply with applicable federal civil rights laws and [place] laws. We do not discriminate on the basis of race, color, national origin, age, disability, sex, sexual orientation, or gender identity.               Review of your medicines      START taking        Dose / Directions    sennosides 8.6 MG tablet   Commonly known as:  SENOKOT   Used for:  Urinary retention        Dose:  1-3 tablet   Take 1-3 tablets by mouth daily as needed for constipation   Quantity:  20 tablet   Refills:  0         CONTINUE these medicines which have NOT CHANGED        Dose / Directions    CITALOPRAM HYDROBROMIDE PO        Dose:  20 mg   Take 20 mg by mouth daily   Refills:  0       senna-docusate 8.6-50 MG per tablet   Commonly known as:  SENOKOT-S;PERICOLACE   Used for:  Other constipation        Dose:  1 tablet   Take 1 tablet by mouth 2 times daily as needed for constipation   Quantity:  100 tablet   Refills:  0       TYLENOL PO        Dose:  1000 mg   Take 1,000 mg by mouth 3 times daily Do not exceed 4000 mg po in 24 hours.   Refills:  0       VITAMIN D (CHOLECALCIFEROL) PO        Dose:  1000 Units   Take 1,000 Units by mouth daily   Refills:  0         STOP taking     MACROBID PO           TAMSULOSIN HCL PO                Where to get your medicines      These medications were sent to  [facility] [place]  [place], [place] - [address]  [address] [place] [address]     Phone:  [phone]     sennosides 8.6 MG tablet                Protect others around you: Learn how to safely use, store and throw away your medicines at www.disposemymeds.org.             Medication List: This is a list of all your medications and when to take them. Check marks below indicate your daily home schedule. Keep this list as a reference.      Medications           Morning Afternoon Evening Bedtime As Needed    CITALOPRAM HYDROBROMIDE PO   Take 20 mg by mouth daily   Last time this was given:  20 mg on 4/6/2018  9:33 AM                                senna-docusate 8.6-50 MG per tablet   Commonly known as:  SENOKOT-S;PERICOLACE   Take 1 tablet by mouth 2 times daily as needed for constipation                                sennosides 8.6 MG tablet   Commonly known as:  SENOKOT   Take 1-3 tablets by mouth daily as needed for constipation                                TYLENOL PO   Take 1,000 mg by mouth 3 times daily Do not exceed 4000 mg po in 24 hours.                                VITAMIN D (CHOLECALCIFEROL) PO   Take 1,000 Units by mouth daily

## 2018-04-05 NOTE — IP AVS SNAPSHOT
[address], [address]    [facility] [address]    Phone:  [phone]                                       After Visit Summary   4/5/2018    Kaz Perkins    MRN: [id number]           After Visit Summary Signature Page     I have received my discharge instructions, and my questions have been answered. I have discussed any challenges I see with this plan with the nurse or doctor.    ..........................................................................................................................................  Patient/Patient Representative Signature      ..........................................................................................................................................  Patient Representative Print Name and Relationship to Patient    ..................................................               ................................................  Date                                            Time    ..........................................................................................................................................  Reviewed by Signature/Title    ...................................................              ..............................................  Date                                                            Time

## 2018-04-05 NOTE — PROGRESS NOTES
Pt arrived to floor at 1445 from PACU.  VSS, Capno in place, alicia draining to gravity.  Denies pain.  Bed alarm on, report given to oncoming RN.

## 2018-04-05 NOTE — OP NOTE
Procedure Date: 04/05/2018      DATE OF PROCEDURE:  04/05/2018      PREOPERATIVE DIAGNOSIS:  Benign prostatic hypertrophy with urinary retention.      POSTOPERATIVE DIAGNOSIS:  Benign prostatic hypertrophy with urinary retention.      PROCEDURE:  Cystoscopy and Quanta laser transurethral resection of the prostate.       SURGEON:  Art Maldonado MD      ANESTHESIA:  Spinal anesthesia.      DESCRIPTION OF PROCEDURE:  The patient was brought to the operative suite and after placement of a spinal anesthetic was placed in the dorsal lithotomy position with the genital area prepped and draped in customary fashion.  Timeout was then called.       The 22 Tuvaluan laserscope with the visual obturator in place was then carefully inserted through this distal hypospadiac urethra without difficulty.  The urethra otherwise was normal, the external sphincter intact when viewed from the verumontanum.  There was this high median bar with smaller lateral prostatic lobes and the interior bladder showed very marked trabeculation with saccule formation.  The ureteric orifices could be identified well back from the bladder neck.  There was no evidence of neoplasm or stone in the bladder.  I then removed the visual obturator and inserted the laser scope in its place and then inserted the Quanta laser fiber and after identification of the ureteric orifices and the verumontanum, I began the procedure with 120 campbell with the Quanta laser taking down the median lobe with incisions at 7 o'clock and 5 o'clock, meeting in the midline below the bladder neck and then mobilizing most of the median lobe in a wedge-like fashion so that this space was then excised free. I then continued the procedure by performing laser vaporization, still at 120 campbell, of all tissue between bladder neck and verumontanum beginning with the residual of the median lobe between 5 and 7 o'clock and continuing with the patient's left lateral lobe from 5 to 1 o'clock, the right  lateral lobe from 7 to 11 o'clock and completing with vaporization of the anterior tissue between 11 o'clock and 1 o'clock.  There were in fact very few significant bleeding points. These were controlled by using the laser at 70 campbell. A large number of prostatic calculi were released in the course of the procedure.  These were evacuated from the bladder. I removed the wedge of the bladder neck with graspers.  I finally then performed an incision all the way from the interureteric ridge to the verumontanum in the 6 o'clock position to open up the bladder neck as much as possible.  The instrument was then carefully withdrawn.  I introduced a 20 Haitian 3-way catheter through the urethra into the bladder with the aid of a catheter guide, inflated the balloon with about 30 mL of fluid.  I did not place traction. I did irrigate the catheter with is 60 mL syringe and there was absolutely no bleeding, so I decided not to commence continuous bladder irrigation but just plugged the third port and the catheter was connected to drainage. A 30 mg suppository was placed in the rectum and the patient taken to the recovery room having tolerated the procedure well.      CONCLUSION:  As we recall from prior to surgery he was in retention and getting repeated infections because of a presence of a catheter in the bladder. The move today is to endeavor to be able to get him to void spontaneously.  He does live in an assisted living facility and does have dementia, although he is mobile and can get to and fro from the bathroom with some help. We will anticipate sending him home tomorrow with a catheter in place for almost a week and then return him to the office for the nurse to remove the catheter and for a subsequent trial of voiding.         MYRA SHANKAR MD             D: 2018   T: 2018   MT: MALISSA      Name:     ARELIS ALLAN   MRN:      [id number]        Account:        [id number]   :      1935            Procedure Date: 04/05/2018      Document: [id number]       cc: Art Maldonado MD

## 2018-04-05 NOTE — ANESTHESIA POSTPROCEDURE EVALUATION
Patient: Kaz Perkins    Procedure(s):  CYSTOSCOPY TRANSURETHRAL RESECTION OF THE PROSTATE WITH THE QUANTA LASER (OFFICE WILL SET UP REP WITH POI)  - Wound Class: II-Clean Contaminated    Diagnosis:URINARY RETENTION AND BPH  Diagnosis Additional Information: No value filed.    Anesthesia Type:  Spinal    Note:  Anesthesia Post Evaluation    Patient location during evaluation: PACU  Patient participation: Able to fully participate in evaluation  Level of consciousness: awake  Pain management: adequate  Airway patency: patent  Cardiovascular status: acceptable  Respiratory status: acceptable  Hydration status: acceptable  PONV: none     Anesthetic complications: None          Last vitals:  Vitals:    04/05/18 1410 04/05/18 1420 04/05/18 1448   BP: 141/74 154/82 157/76   Pulse:      Resp: 12 11 12   Temp:   35.8  C (96.4  F)   SpO2: 96% 97% 90%         Electronically Signed By: Lyssa Awan  April 5, 2018  3:58 PM

## 2018-04-05 NOTE — ANESTHESIA PREPROCEDURE EVALUATION
Anesthesia Evaluation     . Pt has had prior anesthetic.     No history of anesthetic complications          ROS/MED HX    ENT/Pulmonary:      (-) sleep apnea   Neurologic:     (+)dementia,     Cardiovascular: Comment: Hx of lbbb    Normal echo    (+) ----. : . . . :. dysrhythmias (sinus ching) .       METS/Exercise Tolerance:     Hematologic:     (+) Anemia, -      Musculoskeletal:         GI/Hepatic:  - neg GI/hepatic ROS       Renal/Genitourinary:         Endo:         Psychiatric:         Infectious Disease:         Malignancy:         Other:                     Physical Exam  Normal systems: dental    Airway   Mallampati: II  TM distance: >3 FB  Neck ROM: full    Dental     Cardiovascular   Rhythm and rate: regular      Pulmonary    breath sounds clear to auscultation                    Anesthesia Plan      History & Physical Review  History and physical reviewed and following examination; no interval change.    ASA Status:  2 .        Plan for Spinal with Intravenous induction.     Patient is dnr/dni. Conversation with family thjat they do not want cpr/ chest compressions/shocks during anesthesia.      Postoperative Care  Postoperative pain management:  IV analgesics.      Consents  Anesthetic plan, risks, benefits and alternatives discussed with:  Daughter/Son..                          .

## 2018-04-05 NOTE — PROGRESS NOTES
Admission medication history interview status for the 4/5/2018  admission is complete. See EPIC admission navigator for prior to admission medications     Medication history source reliability:Good    Medication history interview source(s):Caregiver    Medication history resources (including written lists, pill bottles, clinic record):Recieved medication list and went over last doses with  [facility] at [facility] [place]    Primary pharmacy[facility]    Additional medication history information not noted on PTA med list :None    Time spent in this activity: 60 minutes    Prior to Admission medications    Medication Sig Last Dose Taking? Auth Provider   senna-docusate (SENOKOT-S;PERICOLACE) 8.6-50 MG per tablet Take 1 tablet by mouth 2 times daily as needed for constipation More than a month at PRN Yes Valdez Mclaughlin,    Acetaminophen (TYLENOL PO) Take 1,000 mg by mouth 3 times daily Do not exceed 4000 mg po in 24 hours. More than a Month at PRN Yes Unknown, Entered By History   CITALOPRAM HYDROBROMIDE PO Take 20 mg by mouth daily 4/4/2018 at AM Yes Unknown, Entered By History   TAMSULOSIN HCL PO Take 0.4 mg by mouth every morning After breakfast 4/4/2018 at AM Yes Unknown, Entered By History   VITAMIN D, CHOLECALCIFEROL, PO Take 1,000 Units by mouth daily 4/4/2018 at AM Yes Unknown, Entered By History   Nitrofurantoin Monohyd Macro (MACROBID PO) Take 100 mg by mouth 2 times daily 4/4/2018 at PM  Reported, Patient

## 2018-04-05 NOTE — ANESTHESIA PROCEDURE NOTES
Peripheral nerve/Neuraxial procedure note : intrathecal  Pre-Procedure  Performed by UZMA WHITFIELD  Location: OR      Pre-Anesthestic Checklist: patient identified, IV checked, risks and benefits discussed, informed consent, monitors and equipment checked and pre-op evaluation    Timeout  Correct Patient: Yes   Correct Procedure: Yes   Correct Site: Yes   Correct Laterality: N/A   Correct Position: Yes   Site Marked: N/A   .   Procedure Documentation    .    Procedure:    Intrathecal.  Insertion Site:L3-4  (midline approach)      Patient Prep;povidone-iodine 7.5% surgical scrub.  .  Needle: Reba tip Spinal Needle (gauge): 25  Spinal/LP Needle Length (inches): 3.5 # of attempts: 1 and # of redirects:  Introducer used .       Assessment/Narrative  Paresthesias: No.  .  .  clear CSF fluid removed while sitting   . Comments:  Subarachnoid Block  1.6 ml 0.75% bupivacaine with dextrose

## 2018-04-06 VITALS
RESPIRATION RATE: 16 BRPM | HEART RATE: 62 BPM | WEIGHT: 149.47 LBS | HEIGHT: 68 IN | DIASTOLIC BLOOD PRESSURE: 68 MMHG | OXYGEN SATURATION: 91 % | BODY MASS INDEX: 22.65 KG/M2 | TEMPERATURE: 96 F | SYSTOLIC BLOOD PRESSURE: 156 MMHG

## 2018-04-06 LAB — COPATH REPORT: NORMAL

## 2018-04-06 PROCEDURE — 25000128 H RX IP 250 OP 636: Performed by: PHYSICIAN ASSISTANT

## 2018-04-06 PROCEDURE — A9270 NON-COVERED ITEM OR SERVICE: HCPCS | Mod: GY | Performed by: UROLOGY

## 2018-04-06 PROCEDURE — 25000132 ZZH RX MED GY IP 250 OP 250 PS 637: Mod: GY | Performed by: UROLOGY

## 2018-04-06 RX ORDER — SENNOSIDES 8.6 MG
1-3 TABLET ORAL DAILY PRN
Qty: 20 TABLET | Refills: 0 | Status: SHIPPED | OUTPATIENT
Start: 2018-04-06 | End: 2018-05-04

## 2018-04-06 RX ORDER — FUROSEMIDE 10 MG/ML
20 INJECTION INTRAMUSCULAR; INTRAVENOUS
Status: DISCONTINUED | OUTPATIENT
Start: 2018-04-06 | End: 2018-04-06 | Stop reason: HOSPADM

## 2018-04-06 RX ADMIN — FUROSEMIDE 20 MG: 10 INJECTION, SOLUTION INTRAVENOUS at 11:06

## 2018-04-06 RX ADMIN — CITALOPRAM HYDROBROMIDE 20 MG: 20 TABLET ORAL at 09:33

## 2018-04-06 NOTE — PLAN OF CARE
Problem: Surgery Nonspecified (Adult)  Goal: Signs and Symptoms of Listed Potential Problems Will be Absent, Minimized or Managed (Surgery Nonspecified)  Signs and symptoms of listed potential problems will be absent, minimized or managed by discharge/transition of care (reference Surgery Nonspecified (Adult) CPG).   Outcome: Adequate for Discharge Date Met: 04/06/18  Pt alert to self only, disoriented to time/place/situation d/t baseline dementia.  VSS, denies pain.  Desatted on room air, MD notified and 1 dose IV Lasix given which resolved hypoxia.  Pt ambulated halls with A1 + walker prior to discharge on room air with saturation 91-92%.  LS clear.  BS present with 1 small incontinent BM noted.  Advanced to regular diet and tolerated breakfast well, no c/o nausea.  Pt switched to leg bag with night alicia bag sent with patient.  Pt daughter at bedside during discharge instructions, and declined detailed alicia teaching as patient has had indwelling alicia prior and is discharging to Assisted living facility that will manage alicia for patient.  Daughter Vicki declined discharge med Senna d/t patient having PRN prescription at assisted living facility.  All personal belongings sent with patient including walker.  IV removed per patient, RN covered with band-aid.  Pt left floor at 1300 escorted by nursing aide.

## 2018-04-06 NOTE — PLAN OF CARE
Problem: Patient Care Overview  Goal: Plan of Care/Patient Progress Review  Outcome: No Change  A&Ox1 oriented to self only. Baseline dementia. POD#1 TURP and Cysto. Capno turned off d/t pt refusal, currently sating low 90s on 3L 02. Denies pain. Hypoactive bowel sounds and per pt is not passing flatus. Has not had much of an appetite, encourage full liquids today. Pt will go home with ravin, needs teaching done. Will continue to monitor.

## 2018-04-06 NOTE — PROGRESS NOTES
[facility] Progress Note          Assessment and Plan:   Active Problems:    Retention, urine    Assessment: POD 1 TURP    Plan: Wean O2, home today with Smith. TOV Wed next week. Will ask our office to coordinate.      Batsheva Nolan PA-C  [facility]  Cell: [phone] (text or call until 5pm, Mon-Wed & Fri)               Interval History:   doing well; no cp, sob, n/v/d, or abd pain. Smith clear              Review of Systems:   The 5 point Review of Systems is negative other than noted in the HPI             Medications:     Current Facility-Administered Medications Ordered in Epic   Medication Dose Route Frequency Last Rate Last Dose     lidocaine 1 % 1 mL  1 mL Other Q1H PRN         lidocaine (LMX4) cream   Topical Q1H PRN         sodium chloride (PF) 0.9% PF flush 3 mL  3 mL Intracatheter Q1H PRN         sodium chloride (PF) 0.9% PF flush 3 mL  3 mL Intracatheter Q8H         naloxone (NARCAN) injection 0.1-0.4 mg  0.1-0.4 mg Intravenous Q2 Min PRN         lactated ringers infusion   Intravenous Continuous 100 mL/hr at 04/05/18 1652       neomycin-bacitracin-polymyxin (NEOSPORIN) ointment   Topical 4x Daily PRN         ondansetron (ZOFRAN-ODT) ODT tab 4 mg  4 mg Oral Q6H PRN        Or     ondansetron (ZOFRAN) injection 4 mg  4 mg Intravenous Q6H PRN         citalopram (celeXA) tablet 20 mg  20 mg Oral Daily   20 mg at 04/05/18 1602     ibuprofen (ADVIL/MOTRIN) tablet 600 mg  600 mg Oral Q6H PRN         No current [facility]-ordered outpatient prescriptions on file.                  Physical Exam:   Vitals were reviewed  Patient Vitals for the past 8 hrs:   BP Temp Temp src Heart Rate Resp SpO2   04/06/18 0136 150/70 97.6  F (36.4  C) Oral 47 12 90 %     GEN: NAD, lying in bed  HEENT: EOMI  NECK: Supple  ABD: Obese, soft  EXT: No LE edema  : Smith clear           Data:   No results found for: NTBNPI, NTBNP  Lab Results   Component Value Date    WBC 6.3 01/13/2018    WBC 7.8 01/12/2018     HGB 13.4 01/13/2018    HGB 12.4 (L) 01/12/2018    HCT 39.8 (L) 01/13/2018    HCT 37.2 (L) 01/12/2018    MCV 94 01/13/2018    MCV 94 01/12/2018     01/13/2018     01/12/2018     No results found for: INR

## 2018-04-06 NOTE — PROGRESS NOTES
HECTOR  I: HECTOR received call from patient's nursing home- [facility] who stated that patient would be able to d/c back to facility. Meds will need to be filled at hospital and sent with patient. Orders will need to be faxed to [phone]. HECTOR was updated by RN that daughter will transport patient at 1200.     -orders faxed.    P: SW will continue to follow and assist as needed.    Cynthia Bernal, DEONTE   [phone]

## 2018-04-06 NOTE — PLAN OF CARE
Problem: Patient Care Overview  Goal: Plan of Care/Patient Progress Review  Outcome: Improving  A&Ox1 to self only. Baseline dementia. TURP procedure performed today. Capno in place. Smith. VSS, but oxygen levels low on 2L. Incentive spirometry and deep breathing encouraged. Denies pain. Ibuprofen available PRN. No BM today, hypoactive bowel sounds, not passing gas. Poor appetite, hasn't ordered any food today. Full liquid diet.       Pt's capno alarming throughout the evening while pt is sleeping. Alarm indicating slowed, shallow breaths - however pt is breathing normally and without shortness of breath when woken up. O2 sats in low 90s on 3L. RN checking on pt frequently with no signs of distress or difficulty breathing. Consideration of possible sleep apnea?

## 2018-04-06 NOTE — DISCHARGE SUMMARY
[facility] Discharge Summary: Urology    Kaz Perkins MRN# [id number]   Age: 83 year old YOB: 1935     Date of Admission:  4/5/2018  Date of Discharge::  4/6/2018  Admitting Physician:  Art Maldonado MD  Discharge Physician:  Batsheva Nolan PA-C, FRANCISCA           Admission Diagnoses:   Urinary retention          Discharge Diagnosis:   Same          Procedures:   TURP          Medications Prior to Admission:     Prescriptions Prior to Admission   Medication Sig Dispense Refill Last Dose     senna-docusate (SENOKOT-S;PERICOLACE) 8.6-50 MG per tablet Take 1 tablet by mouth 2 times daily as needed for constipation 100 tablet 0 More than a month at PRN     Acetaminophen (TYLENOL PO) Take 1,000 mg by mouth 3 times daily Do not exceed 4000 mg po in 24 hours.   More than a Month at PRN     CITALOPRAM HYDROBROMIDE PO Take 20 mg by mouth daily   4/4/2018 at AM     VITAMIN D, CHOLECALCIFEROL, PO Take 1,000 Units by mouth daily   4/4/2018 at AM     [DISCONTINUED] Nitrofurantoin Monohyd Macro (MACROBID PO) Take 100 mg by mouth 2 times daily   4/4/2018 at PM     [DISCONTINUED] TAMSULOSIN HCL PO Take 0.4 mg by mouth every morning After breakfast   4/4/2018 at AM             Discharge Medications:     Current Discharge Medication List      START taking these medications    Details   sennosides (SENOKOT) 8.6 MG tablet Take 1-3 tablets by mouth daily as needed for constipation  Qty: 20 tablet, Refills: 0    Associated Diagnoses: Urinary retention         CONTINUE these medications which have NOT CHANGED    Details   senna-docusate (SENOKOT-S;PERICOLACE) 8.6-50 MG per tablet Take 1 tablet by mouth 2 times daily as needed for constipation  Qty: 100 tablet, Refills: 0    Associated Diagnoses: Other constipation      Acetaminophen (TYLENOL PO) Take 1,000 mg by mouth 3 times daily Do not exceed 4000 mg po in 24 hours.      CITALOPRAM HYDROBROMIDE PO Take 20 mg by mouth daily      VITAMIN D,  CHOLECALCIFEROL, PO Take 1,000 Units by mouth daily         STOP taking these medications       Nitrofurantoin Monohyd Macro (MACROBID PO) Comments:   Reason for Stopping:         TAMSULOSIN HCL PO Comments:   Reason for Stopping:                     Consultations:     None          Hospital Course:     The patient underwent the above procedure and tolerated this well.  Uncomplicated post operative course. Had adequate pain control, ambulating and tolerating regular diet at the time of discharge.            Discharge Instructions and Follow-Up:   Discharge diet: Regular   Discharge activity: No lifting >10lbs or strenuous exercise for 4 week(s)   Discharge follow-up: TOV next week               Discharge Disposition:   Discharged to home        Batsheva Nolan PA-C  [facility] Urology

## 2018-04-06 NOTE — PROGRESS NOTES
MD Notification    Person notified: PA    Person Name: Batsheva Nolan    Date/Time: 4/6/18    Interaction: Phone call, voice mail left    Purpose of Notification: Patient to dc today, required 4 L O2 overnight.  Attempts to wean down and sats 85-90% on room air.  No orders for home O2, please advise.    Orders Received: 1 dose IV lasix 20mg, call back if not resolved 1 hour. DC fluids

## 2018-04-12 ENCOUNTER — ALLIED HEALTH/NURSE VISIT (OUTPATIENT)
Dept: UROLOGY | Facility: CLINIC | Age: 83
End: 2018-04-12
Payer: COMMERCIAL

## 2018-04-12 DIAGNOSIS — R33.9 RETENTION, URINE: ICD-10-CM

## 2018-04-12 DIAGNOSIS — R33.9 RETENTION, URINE: Primary | ICD-10-CM

## 2018-04-12 LAB — RESIDUAL VOLUME (RV) (EXTERNAL): 40

## 2018-04-12 PROCEDURE — 99207 HC MEASURE POST-VOID RESIDUAL URINE/BLADDER CAPACITY, US NON-IMAGING: CPT

## 2018-04-12 ASSESSMENT — PAIN SCALES - GENERAL: PAINLEVEL: NO PAIN (0)

## 2018-04-12 NOTE — MR AVS SNAPSHOT
"              After Visit Summary   4/12/2018    Kaz Perkins    MRN: [id number]           Patient Information     Date Of Birth          1935        Visit Information        Provider Department      4/12/2018 1:30 PM UA NURSE [facility] Urology [facility]        Today's Diagnoses     Retention, urine           Follow-ups after your visit        Your next 10 appointments already scheduled     May 04, 2018 10:30 AM CDT   Post-Op with Batsheva Nolan PA-C   [facility] Urology [facility] ([facility])    [address]  [address]  [facility] [id number] [phone]              Who to contact     If you have questions or need follow up information about today's clinic visit or your schedule please contact [facility] UROLOGY [facility] directly at [phone].  Normal or non-critical lab and imaging results will be communicated to you by [employer], letter or phone within 4 business days after the clinic has received the results. If you do not hear from us within 7 days, please contact the clinic through [employer] or phone. If you have a critical or abnormal lab result, we will notify you by phone as soon as possible.  Submit refill requests through [facility] [employer] [facility] or call your pharmacy and they will forward the refill request to us. Please allow 3 business days for your refill to be completed.          Additional Information About Your Visit        MyChart Information     [facility] lets you send messages to your doctor, view your test results, renew your prescriptions, schedule appointments and more. To sign up, go to [email or web address] . Click on \"Log in\" on the left side of the screen, which will take you to the Welcome page. Then click on \"Sign up Now\" on the right side of the page.     You will be asked to enter the access code listed below, as well as some personal information. Please follow the directions to create " your username and password.     Your access code is: [email or web address]  Expires: 4/15/2018  4:24 PM     Your access code will  in 90 days. If you need help or a new code, please call your [facility] or [phone].        Care EveryWhere ID     This is your Care EveryWhere ID. This could be used by other organizations to access your [place] medical records  [id number]         Blood Pressure from Last 3 Encounters:   18 156/68   18 122/70   18 124/74    Weight from Last 3 Encounters:   18 67.8 kg (149 lb 7.6 oz)   18 66.7 kg (147 lb)   18 66.7 kg (147 lb)              We Performed the Following     MEASURE POST-VOID RESIDUAL URINE/BLADDER CAPACITY, US NON-IMAGING (62070)        Primary Care Provider Fax #    Physician No Ref-Primary [phone]       No address on file        Equal Access to Services     MARCELLA SOLIS : Hadii katie ku hadasho Soomaali, waaxda luqadaha, qaybta kaalmada adeegyada, waxay niesha hansen . So [facility] [phone].    ATENCIÓN: Si habla español, tiene a espinoza disposición servicios gratuitos de asistencia lingüística. Llame al [phone].    We comply with applicable federal civil rights laws and [place] laws. We do not discriminate on the basis of race, color, national origin, age, disability, sex, sexual orientation, or gender identity.            Thank you!     Thank you for choosing [facility] UROLOGY CLINIC [place]  for your care. Our goal is always to provide you with excellent care. Hearing back from our patients is one way we can continue to improve our services. Please take a few minutes to complete the written survey that you may receive in the mail after your visit with us. Thank you!             Your Updated Medication List - Protect others around you: Learn how to safely use, store and throw away your medicines at www.disposemymeds.org.          This list is accurate as of 18  5:10 PM.  Always  use your most recent med list.                   Brand Name Dispense Instructions for use Diagnosis    CITALOPRAM HYDROBROMIDE PO      Take 20 mg by mouth daily        senna-docusate 8.6-50 MG per tablet    SENOKOT-S;PERICOLACE    100 tablet    Take 1 tablet by mouth 2 times daily as needed for constipation    Other constipation       sennosides 8.6 MG tablet    SENOKOT    20 tablet    Take 1-3 tablets by mouth daily as needed for constipation    Urinary retention       TYLENOL PO      Take 1,000 mg by mouth 3 times daily Do not exceed 4000 mg po in 24 hours.        VITAMIN D (CHOLECALCIFEROL) PO      Take 1,000 Units by mouth daily

## 2018-04-12 NOTE — PROGRESS NOTES
Kaz Perkins comes into clinic today at the request of Dr. Maldonado for trial of void after a TURP 4/05/18.  This service provided today was under the supervising provider of the day, Dr. Maldonado, who was available if needed.  Leg bag removed from catheter.  240 cc sterile water instilled into bladder through catheter via gravity.  Balloon deflated.  Smith catheter removed.  Patient was able to void 200 cc.  PVR = 40 cc.  Patient and daughter instructed to go to emergency room if Clemente cannot void or is voiding in small amounts or is having pain and/or pressure in the bladder area.  Call with any questions or concerns.    Xin Hua LPN

## 2018-05-03 DIAGNOSIS — N40.0 BPH (BENIGN PROSTATIC HYPERPLASIA): Primary | ICD-10-CM

## 2018-05-04 ENCOUNTER — OFFICE VISIT (OUTPATIENT)
Dept: UROLOGY | Facility: CLINIC | Age: 83
End: 2018-05-04
Payer: COMMERCIAL

## 2018-05-04 VITALS
SYSTOLIC BLOOD PRESSURE: 118 MMHG | HEART RATE: 80 BPM | WEIGHT: 145 LBS | HEIGHT: 67 IN | BODY MASS INDEX: 22.76 KG/M2 | DIASTOLIC BLOOD PRESSURE: 60 MMHG

## 2018-05-04 DIAGNOSIS — R33.8 BENIGN PROSTATIC HYPERPLASIA WITH URINARY RETENTION: ICD-10-CM

## 2018-05-04 DIAGNOSIS — N40.1 BENIGN PROSTATIC HYPERPLASIA WITH URINARY RETENTION: ICD-10-CM

## 2018-05-04 LAB
ALBUMIN UR-MCNC: 100 MG/DL
APPEARANCE UR: CLEAR
BILIRUB UR QL STRIP: NEGATIVE
COLOR UR AUTO: YELLOW
GLUCOSE UR STRIP-MCNC: NEGATIVE MG/DL
HGB UR QL STRIP: ABNORMAL
KETONES UR STRIP-MCNC: NEGATIVE MG/DL
LEUKOCYTE ESTERASE UR QL STRIP: ABNORMAL
NITRATE UR QL: NEGATIVE
PH UR STRIP: 7 PH (ref 5–7)
RESIDUAL VOLUME (RV) (EXTERNAL): 68
SOURCE: ABNORMAL
SP GR UR STRIP: 1.02 (ref 1–1.03)
UROBILINOGEN UR STRIP-ACNC: 0.2 EU/DL (ref 0.2–1)

## 2018-05-04 PROCEDURE — 51798 US URINE CAPACITY MEASURE: CPT | Mod: 58 | Performed by: PHYSICIAN ASSISTANT

## 2018-05-04 PROCEDURE — 99024 POSTOP FOLLOW-UP VISIT: CPT | Performed by: PHYSICIAN ASSISTANT

## 2018-05-04 PROCEDURE — 87086 URINE CULTURE/COLONY COUNT: CPT | Performed by: PHYSICIAN ASSISTANT

## 2018-05-04 PROCEDURE — 81003 URINALYSIS AUTO W/O SCOPE: CPT | Performed by: PHYSICIAN ASSISTANT

## 2018-05-04 ASSESSMENT — PAIN SCALES - GENERAL: PAINLEVEL: NO PAIN (0)

## 2018-05-04 NOTE — LETTER
5/4/2018       RE: Kaz Perkins  [address]  [facility] [address]     Dear Colleague,    Thank you for referring your patient, Kaz Perkins, to the [facility] UROLOGY CLINIC [place] at [facility]. Please see a copy of my visit note below.    CC: Post op    This pleasant 83-year-old gentleman returns today for 4 week post op following laser TURP. Has dementia and is accompanied by his daughter.  We recall, he had a fracture of the right hip in October 2017.  He had problems with urinary retention after that although appeared to resolve by January of this year when he was able to void.  However he then developed retention again and had a catheter placed again.  Underwent TURP on 4/5/18. Per daughter, has done well and no concerns other than to ensure his urine is without infection today (MRSA Cx pre-op).    Past Medical History:   Diagnosis Date     Adjustment reaction with anxiety and depression      Anemia      BPH with urinary obstruction      Dementia      Encephalopathy      FH: colon cancer      LBBB (left bundle branch block)      POLST (Physician Orders for Life-Sustaining Treatment)      Sinus bradycardia      Skin cancer     basal cell     Ureterolithiasis      Urethral meatal stenosis      Vitamin D deficiency      Past Surgical History:   Procedure Laterality Date     COLONOSCOPY       ENT SURGERY      wisdom teeth     EYE SURGERY      cataracts     LASER REVOLIX PHOTOSELECTIVE VAPORIZATION PROSTATE N/A 4/5/2018    Procedure: LASER REVOLIX / QUANTA PHOTOSELECTIVE VAPORIZATION PROSTATE;  CYSTOSCOPY TRANSURETHRAL RESECTION OF THE PROSTATE WITH THE QUANTA LASER (OFFICE WILL SET UP REP WITH [email or web address]) ;  Surgeon: Art Maldonado MD;  Location:  OR     ORTHOPEDIC SURGERY      femur fracture     Current Outpatient Prescriptions   Medication     Acetaminophen (TYLENOL PO)     CITALOPRAM  "HYDROBROMIDE PO     senna-docusate (SENOKOT-S;PERICOLACE) 8.6-50 MG per tablet     VITAMIN D, CHOLECALCIFEROL, PO     No current facility-administered medications for this visit.      PE:   /60 (BP Location: Left arm, Patient Position: Sitting, Cuff Size: Adult Regular)  Pulse 80  Ht 1.702 m (5' 7\")  Wt 65.8 kg (145 lb)  BMI 22.71 kg/m2  GEN: NAD      PVR: 68cc    Impression: s/p TURP, doing well    Plan.  Annual visits or sooner if any concerns. Will contact if u/c from today is + and needs treatment.     Time.  20 minutes were spent in face to face manner discussing post TURP.    Rx would need to be faxed to [facility]: Talk to Lidia Almaguer, charge RN: [phone]    CC: Mina Alas, [facility], Phone: [phone]      Again, thank you for allowing me to participate in the care of your patient.      Sincerely,    Batsheva Nolan PA-C, PABernardoC      "

## 2018-05-04 NOTE — LETTER
Date:May 7, 2018      Provider requested that no letter be sent. Do not send.       [facility] Health Information

## 2018-05-04 NOTE — PROGRESS NOTES
"CC: Post op    This pleasant 83-year-old gentleman returns today for 4 week post op following laser TURP. Has dementia and is accompanied by his daughter.  We recall, he had a fracture of the right hip in October 2017.  He had problems with urinary retention after that although appeared to resolve by January of this year when he was able to void.  However he then developed retention again and had a catheter placed again.  Underwent TURP on 4/5/18. Per daughter, has done well and no concerns other than to ensure his urine is without infection today (MRSA Cx pre-op).    Past Medical History:   Diagnosis Date     Adjustment reaction with anxiety and depression      Anemia      BPH with urinary obstruction      Dementia      Encephalopathy      FH: colon cancer      LBBB (left bundle branch block)      POLST (Physician Orders for Life-Sustaining Treatment)      Sinus bradycardia      Skin cancer     basal cell     Ureterolithiasis      Urethral meatal stenosis      Vitamin D deficiency      Past Surgical History:   Procedure Laterality Date     COLONOSCOPY       ENT SURGERY      wisdom teeth     EYE SURGERY      cataracts     LASER REVOLIX PHOTOSELECTIVE VAPORIZATION PROSTATE N/A 4/5/2018    Procedure: LASER REVOLIX / QUANTA PHOTOSELECTIVE VAPORIZATION PROSTATE;  CYSTOSCOPY TRANSURETHRAL RESECTION OF THE PROSTATE WITH THE QUANTA LASER (OFFICE WILL SET UP REP WITH [employer]) ;  Surgeon: rAt Maldonado MD;  Location:  OR     ORTHOPEDIC SURGERY      femur fracture     Current Outpatient Prescriptions   Medication     Acetaminophen (TYLENOL PO)     CITALOPRAM HYDROBROMIDE PO     senna-docusate (SENOKOT-S;PERICOLACE) 8.6-50 MG per tablet     VITAMIN D, CHOLECALCIFEROL, PO     No current facility-administered medications for this visit.      PE:   /60 (BP Location: Left arm, Patient Position: Sitting, Cuff Size: Adult Regular)  Pulse 80  Ht 1.702 m (5' 7\")  Wt 65.8 kg (145 lb)  BMI 22.71 kg/m2  GEN: " NAD      PVR: 68cc    Impression: s/p TURP, doing well    Plan.  Annual visits or sooner if any concerns. Will contact if u/c from today is + and needs treatment.     Time.  20 minutes were spent in face to face manner discussing post TURP.    Rx would need to be faxed to [facility]: Talk to Lidia Almaguer, charge RN: [phone]    CC: [facility], Phone: [phone]

## 2018-05-04 NOTE — MR AVS SNAPSHOT
"              After Visit Summary   5/4/2018    Kaz Perkins    MRN: [id number]           Patient Information     Date Of Birth          1935        Visit Information        Provider Department      5/4/2018 10:30 AM Batsheva Nolan PA-C [facility] Urology Clinic [place]        Today's Diagnoses     Benign prostatic hyperplasia with urinary retention          Care Instructions    Annual exam with Dr. Maldonado or myself or sooner if concerns.           Follow-ups after your visit        Follow-up notes from your care team     Return in about 1 year (around 5/4/2019).      Who to contact     If you have questions or need follow up information about today's clinic visit or your schedule please contact [facility] UROLOGY CLINIC [place] directly at [phone].  Normal or non-critical lab and imaging results will be communicated to you by MyChart, letter or phone within 4 business days after the clinic has received the results. If you do not hear from us within 7 days, please contact the clinic through MyChart or phone. If you have a critical or abnormal lab result, we will notify you by phone as soon as possible.  Submit refill requests through [employer] or call your pharmacy and they will forward the refill request to us. Please allow 3 business days for your refill to be completed.          Additional Information About Your Visit        MyChart Information     [employer] lets you send messages to your doctor, view your test results, renew your prescriptions, schedule appointments and more. To sign up, go to [email or web address] . Click on \"Log in\" on the left side of the screen, which will take you to the Welcome page. Then click on \"Sign up Now\" on the right side of the page.     You will be asked to enter the access code listed below, as well as some personal information. Please follow the directions to create your username and password.     Your access code " "is: [email or web address]  Expires: 2018 11:21 AM     Your access code will  in 90 days. If you need help or a new code, please call your [place] clinic or [phone].        Care EveryWhere ID     This is your Care EveryWhere ID. This could be used by other organizations to access your [place] medical records  [id number]        Your Vitals Were     Pulse Height BMI (Body Mass Index)             80 1.702 m (5' 7\") 22.71 kg/m2          Blood Pressure from Last 3 Encounters:   18 118/60   18 156/68   18 122/70    Weight from Last 3 Encounters:   18 65.8 kg (145 lb)   18 67.8 kg (149 lb 7.6 oz)   18 66.7 kg (147 lb)              We Performed the Following     MEASURE POST-VOID RESIDUAL URINE/BLADDER CAPACITY, US NON-IMAGING (49613)     UA without Microscopic     Urine Culture Aerobic Bacterial [UIK193]        Primary Care Provider Office Phone # Fax #    Mina Miller [phone]       [facility] [address]   [facility] [address]        Equal Access to Services     MARCELLA SOLIS AH: Hadii katie macias hadasho Soomaali, waaxda luqadaha, qaybta kaalmada adeegyada, nadine fenton. So [facility] [phone].    ATENCIÓN: Si habla español, tiene a espinoza disposición servicios gratuitos de asistencia lingüística. Llame al [phone].    We comply with applicable federal civil rights laws and [place] laws. We do not discriminate on the basis of race, color, national origin, age, disability, sex, sexual orientation, or gender identity.            Thank you!     Thank you for choosing [facility] UROLOGY CLINIC [place]  for your care. Our goal is always to provide you with excellent care. Hearing back from our patients is one way we can continue to improve our services. Please take a few minutes to complete the written survey that you may receive in the mail after your visit with us. Thank you!             Your Updated " Medication List - Protect others around you: Learn how to safely use, store and throw away your medicines at www.disposemymeds.org.          This list is accurate as of 5/4/18 11:21 AM.  Always use your most recent med list.                   Brand Name Dispense Instructions for use Diagnosis    CITALOPRAM HYDROBROMIDE PO      Take 20 mg by mouth daily        senna-docusate 8.6-50 MG per tablet    SENOKOT-S;PERICOLACE    100 tablet    Take 1 tablet by mouth 2 times daily as needed for constipation    Other constipation       TYLENOL PO      Take 1,000 mg by mouth 3 times daily Do not exceed 4000 mg po in 24 hours.        VITAMIN D (CHOLECALCIFEROL) PO      Take 1,000 Units by mouth daily

## 2018-05-05 LAB
BACTERIA SPEC CULT: NORMAL
Lab: NORMAL
SPECIMEN SOURCE: NORMAL

## 2020-01-10 ENCOUNTER — TRANSFERRED RECORDS (OUTPATIENT)
Dept: HEALTH INFORMATION MANAGEMENT | Facility: CLINIC | Age: 85
End: 2020-01-10

## 2020-02-26 DIAGNOSIS — R33.9 URINARY RETENTION: Primary | ICD-10-CM

## 2020-03-05 ENCOUNTER — OFFICE VISIT (OUTPATIENT)
Dept: UROLOGY | Facility: CLINIC | Age: 85
End: 2020-03-05
Payer: COMMERCIAL

## 2020-03-05 VITALS
SYSTOLIC BLOOD PRESSURE: 160 MMHG | BODY MASS INDEX: 29.49 KG/M2 | DIASTOLIC BLOOD PRESSURE: 100 MMHG | HEART RATE: 70 BPM | OXYGEN SATURATION: 95 % | WEIGHT: 177 LBS | HEIGHT: 65 IN

## 2020-03-05 DIAGNOSIS — R33.9 URINARY RETENTION: ICD-10-CM

## 2020-03-05 LAB
ALBUMIN UR-MCNC: NEGATIVE MG/DL
APPEARANCE UR: CLEAR
BILIRUB UR QL STRIP: NEGATIVE
COLOR UR AUTO: YELLOW
GLUCOSE UR STRIP-MCNC: NEGATIVE MG/DL
HGB UR QL STRIP: NEGATIVE
KETONES UR STRIP-MCNC: NEGATIVE MG/DL
LEUKOCYTE ESTERASE UR QL STRIP: NEGATIVE
NITRATE UR QL: NEGATIVE
PH UR STRIP: 5.5 PH (ref 5–7)
RESIDUAL VOLUME (RV) (EXTERNAL): 35
SOURCE: NORMAL
SP GR UR STRIP: >1.03 (ref 1–1.03)
UROBILINOGEN UR STRIP-ACNC: 0.2 EU/DL (ref 0.2–1)

## 2020-03-05 PROCEDURE — 51798 US URINE CAPACITY MEASURE: CPT | Performed by: UROLOGY

## 2020-03-05 PROCEDURE — 81003 URINALYSIS AUTO W/O SCOPE: CPT | Performed by: UROLOGY

## 2020-03-05 PROCEDURE — 99213 OFFICE O/P EST LOW 20 MIN: CPT | Mod: 25 | Performed by: UROLOGY

## 2020-03-05 ASSESSMENT — PAIN SCALES - GENERAL: PAINLEVEL: NO PAIN (0)

## 2020-03-05 ASSESSMENT — MIFFLIN-ST. JEOR: SCORE: 1424.75

## 2020-03-05 NOTE — NURSING NOTE
Chief Complaint   Patient presents with     Clinic Care Coordination - Follow-up     urinary retention     PVR was 135 ml today.  Rukhsana Solano LPN

## 2020-03-05 NOTE — LETTER
3/5/2020       RE: Kaz Perkins  [address]  [place] [address]     Dear Colleague,    Thank you for referring your patient, Kaz Perkins, to the [facility]OLOGY CL[facility] [place] at [facility]. Please see a copy of my visit note below.    It is a great pleasure to see this very pleasant 85-year-old gentleman in follow-up consultation today.  It is almost 2 years since he had a Quanta laser transurethral section of the prostate.  Up to that point he had major problems with recurrent urinary retention.  After the surgery he was voiding well and with low post residual volumes  I have not seen him in almost 2 years.  He continues to void well.  He is now living in a memory care unit, but apparently is only getting up occasionally at night, has good control of urine, may have had a urinary tract infection  Cultures were done but did respond to antibiotic recently.  And urinalysis today is completely negative.  PSA was done recently and was 0.65.  Past Medical History:   Diagnosis Date     Adjustment reaction with anxiety and depression      Anemia      BPH with urinary obstruction      Dementia (H)      Encephalopathy      FH: colon cancer      LBBB (left bundle branch block)      POLST (Physician Orders for Life-Sustaining Treatment)      Sinus bradycardia      Skin cancer     basal cell     Ureterolithiasis      Urethral meatal stenosis      Vitamin D deficiency      Past Surgical History:   Procedure Laterality Date     COLONOSCOPY       ENT SURGERY      wisdom teeth     EYE SURGERY      cataracts     LASER REVOLIX PHOTOSELECTIVE VAPORIZATION PROSTATE N/A 4/5/2018    Procedure: LASER REVOLIX / QUANTA PHOTOSELECTIVE VAPORIZATION PROSTATE;  CYSTOSCOPY TRANSURETHRAL RESECTION OF THE PROSTATE WITH THE QUANTA LASER (OFFICE WILL SET UP REP WITH [employer]) ;  Surgeon: Art Maldonado MD;  Location:  OR  "    ORTHOPEDIC SURGERY      femur fracture       Current Outpatient Medications:      Acetaminophen (TYLENOL PO), Take 1,000 mg by mouth 3 times daily Do not exceed 4000 mg po in 24 hours., Disp: , Rfl:      CITALOPRAM HYDROBROMIDE PO, Take 20 mg by mouth daily, Disp: , Rfl:      senna-docusate (SENOKOT-S;PERICOLACE) 8.6-50 MG per tablet, Take 1 tablet by mouth 2 times daily as needed for constipation, Disp: 100 tablet, Rfl: 0     VITAMIN D, CHOLECALCIFEROL, PO, Take 1,000 Units by mouth daily, Disp: , Rfl:       ROS: 10 point ROS neg other than the symptoms noted above in the HPI.    BP (!) 160/100 (BP Location: Left arm)   Pulse 70   Ht 1.651 m (5' 5\")   Wt 80.3 kg (177 lb)   SpO2 95%   BMI 29.45 kg/m     Pleasant gentleman who does not appear in distress.  Mental status he is in good humor but perhaps at times a little confused.  HEENT.  There is no clinical evidence of jaundice on examination of conjunctiva.  Extraocular eye movements normal.  Mucous membranes are unremarkable.  Skin.  Skin is normal to examination.  Respiratory system.  The respiratory cycle is normal.  Cardiovascular system.  There is no significant pitting peripheral edema.  Extremities.  Extremities otherwise unremarkable.  Neurologic system.  There are no focal abnormal clinical neurological signs in the central, or peripheral nervous systems.    Impression.  The patient is doing very well.  Today the postvoid residual is low, the urinalysis is negative and his PSA is 0.65.  He has no major complaints at this time.  Therefore my recommendations are as follows.  I do not need to see him on a regular basis.  He need not have his PSA checked again.  I would see him myself under the following circumstances.  1 if gross hematuria occurs.  2.  If his urinary symptoms deteriorate.  3.  If he starts getting recurrent urinary tract infection.    As I stated above there is no indication now for repeating the PSA given the low level of PSA and " "his age now of 85.  I discussed this carefully with the patient and his daughter in detail today.  I addressed and answered all the questions    Plan.  I will see him on a as needed basis.    Time.  15 minutes with greater than 50% in discussion consultation.    \"This dictation was performed with voice recognition software and may contain errors,  omissions and inadvertent word substitution.\"      Again, thank you for allowing me to participate in the care of your patient.      Sincerely,    Art Maldonado MD      "

## 2020-03-05 NOTE — PROGRESS NOTES
It is a great pleasure to see this very pleasant 85-year-old gentleman in follow-up consultation today.  It is almost 2 years since he had a Quanta laser transurethral section of the prostate.  Up to that point he had major problems with recurrent urinary retention.  After the surgery he was voiding well and with low post residual volumes  I have not seen him in almost 2 years.  He continues to void well.  He is now living in a memory care unit, but apparently is only getting up occasionally at night, has good control of urine, may have had a urinary tract infection  Cultures were done but did respond to antibiotic recently.  And urinalysis today is completely negative.  PSA was done recently and was 0.65.  Past Medical History:   Diagnosis Date     Adjustment reaction with anxiety and depression      Anemia      BPH with urinary obstruction      Dementia (H)      Encephalopathy      FH: colon cancer      LBBB (left bundle branch block)      POLST (Physician Orders for Life-Sustaining Treatment)      Sinus bradycardia      Skin cancer     basal cell     Ureterolithiasis      Urethral meatal stenosis      Vitamin D deficiency      Past Surgical History:   Procedure Laterality Date     COLONOSCOPY       ENT SURGERY      wisdom teeth     EYE SURGERY      cataracts     LASER REVOLIX PHOTOSELECTIVE VAPORIZATION PROSTATE N/A 4/5/2018    Procedure: LASER REVOLIX / QUANTA PHOTOSELECTIVE VAPORIZATION PROSTATE;  CYSTOSCOPY TRANSURETHRAL RESECTION OF THE PROSTATE WITH THE QUANTA LASER (OFFICE WILL SET UP REP WITH [employer]) ;  Surgeon: Art Maldonado MD;  Location: [facility]     ORTHOPEDIC SURGERY      femur fracture       Current Outpatient Medications:      Acetaminophen (TYLENOL PO), Take 1,000 mg by mouth 3 times daily Do not exceed 4000 mg po in 24 hours., Disp: , Rfl:      CITALOPRAM HYDROBROMIDE PO, Take 20 mg by mouth daily, Disp: , Rfl:      senna-docusate (SENOKOT-S;PERICOLACE) 8.6-50 MG per tablet, Take 1  "tablet by mouth 2 times daily as needed for constipation, Disp: 100 tablet, Rfl: 0     VITAMIN D, CHOLECALCIFEROL, PO, Take 1,000 Units by mouth daily, Disp: , Rfl:       ROS: 10 point ROS neg other than the symptoms noted above in the HPI.    BP (!) 160/100 (BP Location: Left arm)   Pulse 70   Ht 1.651 m (5' 5\")   Wt 80.3 kg (177 lb)   SpO2 95%   BMI 29.45 kg/m    Pleasant gentleman who does not appear in distress.  Mental status he is in good humor but perhaps at times a little confused.  HEENT.  There is no clinical evidence of jaundice on examination of conjunctiva.  Extraocular eye movements normal.  Mucous membranes are unremarkable.  Skin.  Skin is normal to examination.  Respiratory system.  The respiratory cycle is normal.  Cardiovascular system.  There is no significant pitting peripheral edema.  Extremities.  Extremities otherwise unremarkable.  Neurologic system.  There are no focal abnormal clinical neurological signs in the central, or peripheral nervous systems.    Impression.  The patient is doing very well.  Today the postvoid residual is low, the urinalysis is negative and his PSA is 0.65.  He has no major complaints at this time.  Therefore my recommendations are as follows.  I do not need to see him on a regular basis.  He need not have his PSA checked again.  I would see him myself under the following circumstances.  1 if gross hematuria occurs.  2.  If his urinary symptoms deteriorate.  3.  If he starts getting recurrent urinary tract infection.    As I stated above there is no indication now for repeating the PSA given the low level of PSA and his age now of 85.  I discussed this carefully with the patient and his daughter in detail today.  I addressed and answered all the questions    Plan.  I will see him on a as needed basis.    Time.  15 minutes with greater than 50% in discussion consultation.    \"This dictation was performed with voice recognition software and may contain errors,  " "omissions and inadvertent word substitution.\"    "

## 2020-03-06 ENCOUNTER — TELEPHONE (OUTPATIENT)
Dept: UROLOGY | Facility: CLINIC | Age: 85
End: 2020-03-06

## 2020-07-21 ENCOUNTER — RECORDS - HEALTHEAST (OUTPATIENT)
Dept: LAB | Facility: CLINIC | Age: 85
End: 2020-07-21

## 2020-07-22 LAB
ANION GAP SERPL CALCULATED.3IONS-SCNC: 9 MMOL/L (ref 5–18)
BUN SERPL-MCNC: 24 MG/DL (ref 8–28)
CALCIUM SERPL-MCNC: 8.8 MG/DL (ref 8.5–10.5)
CHLORIDE BLD-SCNC: 105 MMOL/L (ref 98–107)
CO2 SERPL-SCNC: 26 MMOL/L (ref 22–31)
CREAT SERPL-MCNC: 1.38 MG/DL (ref 0.7–1.3)
ERYTHROCYTE [DISTWIDTH] IN BLOOD BY AUTOMATED COUNT: 13.3 % (ref 11–14.5)
GFR SERPL CREATININE-BSD FRML MDRD: 49 ML/MIN/1.73M2
GLUCOSE BLD-MCNC: 117 MG/DL (ref 70–125)
HCT VFR BLD AUTO: 44.4 % (ref 40–54)
HGB BLD-MCNC: 14 G/DL (ref 14–18)
MCH RBC QN AUTO: 30.1 PG (ref 27–34)
MCHC RBC AUTO-ENTMCNC: 31.5 G/DL (ref 32–36)
MCV RBC AUTO: 96 FL (ref 80–100)
PLATELET # BLD AUTO: 227 THOU/UL (ref 140–440)
PMV BLD AUTO: 11.2 FL (ref 8.5–12.5)
POTASSIUM BLD-SCNC: 4.1 MMOL/L (ref 3.5–5)
RBC # BLD AUTO: 4.65 MILL/UL (ref 4.4–6.2)
SODIUM SERPL-SCNC: 140 MMOL/L (ref 136–145)
TSH SERPL DL<=0.005 MIU/L-ACNC: 1.39 UIU/ML (ref 0.3–5)
WBC: 8.2 THOU/UL (ref 4–11)

## 2020-07-23 ENCOUNTER — RECORDS - HEALTHEAST (OUTPATIENT)
Dept: LAB | Facility: CLINIC | Age: 85
End: 2020-07-23

## 2020-07-23 LAB
ALBUMIN UR-MCNC: NEGATIVE MG/DL
APPEARANCE UR: CLEAR
BILIRUB UR QL STRIP: NEGATIVE
COLOR UR AUTO: YELLOW
GLUCOSE UR STRIP-MCNC: NEGATIVE MG/DL
HGB UR QL STRIP: NEGATIVE
KETONES UR STRIP-MCNC: NEGATIVE MG/DL
LEUKOCYTE ESTERASE UR QL STRIP: NEGATIVE
NITRATE UR QL: NEGATIVE
PH UR STRIP: 6.5 [PH] (ref 4.5–8)
SP GR UR STRIP: 1.02 (ref 1–1.03)
UROBILINOGEN UR STRIP-ACNC: NORMAL

## 2020-07-24 LAB — BACTERIA SPEC CULT: NO GROWTH

## 2020-07-31 ENCOUNTER — COMMUNICATION - HEALTHEAST (OUTPATIENT)
Dept: SCHEDULING | Facility: CLINIC | Age: 85
End: 2020-07-31

## (undated) DEVICE — CATH PLUG W/CAP 000076

## (undated) DEVICE — GLOVE PROTEXIS W/NEU-THERA 8.0  2D73TE80

## (undated) DEVICE — PAD CHUX UNDERPAD 23X24" 7136

## (undated) DEVICE — PACK TUR CUSTOM SBA15RUFSE

## (undated) DEVICE — BAG CYSTO TABLE DRAIN

## (undated) DEVICE — CATH FOLEY 3WAY 20FR 30ML LATEX 0167SI20

## (undated) DEVICE — SOL NACL 0.9% IRRIG 3000ML BAG 2B7477

## (undated) DEVICE — BAG URINARY DRAIN 4000ML LF 153509

## (undated) DEVICE — ESU GROUND PAD UNIVERSAL W/O CORD

## (undated) DEVICE — SYR 50ML CATH TIP W/O NDL 309620

## (undated) RX ORDER — PROPOFOL 10 MG/ML
INJECTION, EMULSION INTRAVENOUS
Status: DISPENSED
Start: 2018-04-05

## (undated) RX ORDER — CEFAZOLIN SODIUM 2 G/100ML
INJECTION, SOLUTION INTRAVENOUS
Status: DISPENSED
Start: 2018-04-05

## (undated) RX ORDER — FENTANYL CITRATE 50 UG/ML
INJECTION, SOLUTION INTRAMUSCULAR; INTRAVENOUS
Status: DISPENSED
Start: 2018-04-05